# Patient Record
Sex: MALE | Race: WHITE | NOT HISPANIC OR LATINO | Employment: STUDENT | URBAN - METROPOLITAN AREA
[De-identification: names, ages, dates, MRNs, and addresses within clinical notes are randomized per-mention and may not be internally consistent; named-entity substitution may affect disease eponyms.]

---
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
## 2018-01-03 RX ORDER — SPIRONOLACTONE 100 MG/1
TABLET, FILM COATED ORAL
Qty: 60 TABLET | Refills: 1 | Status: SHIPPED | OUTPATIENT
Start: 2018-01-03

## 2024-04-16 ENCOUNTER — HOSPITAL ENCOUNTER (EMERGENCY)
Facility: HOSPITAL | Age: 24
Discharge: HOME | End: 2024-04-16
Attending: EMERGENCY MEDICINE
Payer: COMMERCIAL

## 2024-04-16 VITALS
HEIGHT: 68 IN | TEMPERATURE: 98 F | RESPIRATION RATE: 20 BRPM | BODY MASS INDEX: 21.22 KG/M2 | WEIGHT: 140 LBS | SYSTOLIC BLOOD PRESSURE: 118 MMHG | DIASTOLIC BLOOD PRESSURE: 79 MMHG | OXYGEN SATURATION: 99 % | HEART RATE: 92 BPM

## 2024-04-16 DIAGNOSIS — L03.116 CELLULITIS OF LEFT THIGH: ICD-10-CM

## 2024-04-16 DIAGNOSIS — I88.9 LYMPHADENITIS: Primary | ICD-10-CM

## 2024-04-16 DIAGNOSIS — R19.09 LEFT GROIN MASS: ICD-10-CM

## 2024-04-16 PROCEDURE — 76882 US LMTD JT/FCL EVL NVASC XTR: CPT | Mod: 26,LT,, | Performed by: RADIOLOGY

## 2024-04-16 PROCEDURE — 93971 EXTREMITY STUDY: CPT | Mod: TC,LT

## 2024-04-16 PROCEDURE — 76882 US LMTD JT/FCL EVL NVASC XTR: CPT | Mod: TC,LT

## 2024-04-16 PROCEDURE — 93971 EXTREMITY STUDY: CPT | Mod: 26,LT,, | Performed by: RADIOLOGY

## 2024-04-16 PROCEDURE — 99284 EMERGENCY DEPT VISIT MOD MDM: CPT | Mod: 25

## 2024-04-16 RX ORDER — SULFAMETHOXAZOLE AND TRIMETHOPRIM 800; 160 MG/1; MG/1
1 TABLET ORAL 2 TIMES DAILY
Qty: 14 TABLET | Refills: 0 | Status: SHIPPED | OUTPATIENT
Start: 2024-04-16 | End: 2024-04-25 | Stop reason: HOSPADM

## 2024-04-16 NOTE — ED PROVIDER NOTES
"   History     Chief Complaint   Patient presents with    Leg Pain     On high dose estrogen, for the past week has had red hard area to left groin. Was ordered US r/o DVT by Dr. Sullivan but the earliest they can see her is may 2nd.      HPI:  Jadiel Wright" is a 23 y.o. trans male-to-female patient with PMH as below on high-dose estrogen therapy with endocrinology who presents to the [facility] emergency department for evaluation of left groin swollen lump/mass x1 week with associated subjective fever/chills. Scheduled for DVT r/o US but earliest available is May 2nd.       PCP: No primary care provider on file.    Review of patient's allergies indicates:   Allergen Reactions    No known drug allergies       Past Medical History:   Diagnosis Date    ADHD (attention deficit hyperactivity disorder)     Dr Gar, as of Oct 2012 not taking any meds    Complication of anesthesia     has dental braces in place 2012, cannot remove    Gender dysphoria     followed by Endocrine, Dr. Salgado, in transition    Headache(784.0)     PERFORATION OF TYMPANIC MEMBRANE 2012    right, with otitis 9/28/12     Past Surgical History:   Procedure Laterality Date    ADENOIDECTOMY      TONSILLECTOMY  2002    TYMPANOSTOMY TUBE PLACEMENT  1999,2001    has had bilateral patches to eardrum       Family History   Problem Relation Name Age of Onset    Migraines Mother Praveena     Alcohol abuse Father      Other Brother Mat 1/2 Harish Damico         Mild AR    Migraines Maternal Aunt x2     Migraines Maternal Uncle x3     Heart disease Maternal Grandmother          Arrhythmia    Hypertension Maternal Grandmother      Migraines Maternal Grandmother      Alcohol abuse Maternal Grandfather      COPD Maternal Grandfather      Intellectual disability Maternal Grandfather      Migraines Maternal Grandfather      Allergic rhinitis Brother Laura 1/2 Damien Damico     Migraines Other Mat Dz     Alcohol abuse Other Mat Dz      Social " "History     Tobacco Use    Smoking status: Never    Smokeless tobacco: Never   Substance and Sexual Activity    Alcohol use: Yes     Comment: socially    Drug use: Yes     Types: Marijuana    Sexual activity: Never      Review of Systems     Review of Systems   Constitutional: Negative.    HENT: Negative.     Eyes: Negative.    Respiratory: Negative.     Cardiovascular: Negative.    Gastrointestinal: Negative.    Endocrine: Negative.    Genitourinary: Negative.    Musculoskeletal: Negative.    Skin: Negative.    Allergic/Immunologic: Negative.    Neurological: Negative.  Negative for weakness and numbness.   Hematological: Negative.    Psychiatric/Behavioral: Negative.     All other systems reviewed and are negative.       Physical Exam     Initial Vitals [04/16/24 0922]   BP Pulse Resp Temp SpO2   127/73 (!) 114 20 98.1 °F (36.7 °C) 98 %      MAP       --          Nursing notes and vital signs reviewed.  Constitutional: Patient is in mild distress.   Head: Normocephalic. Atraumatic.   Eyes:  Conjunctivae are not pale. No scleral icterus.   ENT: Mucous membranes moist.   Neck: Supple.   Cardiovascular: Regular rate. Regular rhythm.   Pulmonary: No respiratory distress.   Abdominal: Non-distended.   Musculoskeletal: Moves all extremities. No obvious deformities. Firm, nonfluctuant, warm, erythematous mass palpable in the left groin/proximal thigh that is 4.5 x 3 cm.  Skin: Warm and dry.   Neurological:  Alert, awake, and appropriate. Normal speech. No acute lateralizing neurologic deficits appreciated.   Psychiatric: Normal affect.       ED Course   Procedures  Vitals:    04/16/24 0922 04/16/24 1205   BP: 127/73 133/71   Pulse: (!) 114 78   Resp: 20    Temp: 98.1 °F (36.7 °C)    TempSrc: Oral    SpO2: 98% 99%   Weight: 63.5 kg (140 lb)    Height: 5' 8" (1.727 m)      Lab Results Interpreted as Abnormal:  Labs Reviewed - No data to display   All Lab Results:  Results for orders placed or performed in visit on 03/08/17 "   Estradiol   Result Value Ref Range    Estradiol 71 (H) 11 - 44 pg/mL   Testosterone   Result Value Ref Range    Testosterone, Total 677 195.0 - 1138.0 ng/dL   Basic metabolic panel   Result Value Ref Range    Sodium 138 136 - 145 mmol/L    Potassium 4.0 3.5 - 5.1 mmol/L    Chloride 103 95 - 110 mmol/L    CO2 26 23 - 29 mmol/L    Glucose 77 70 - 110 mg/dL    BUN 13 6 - 20 mg/dL    Creatinine 1.0 0.5 - 1.4 mg/dL    Calcium 9.8 8.7 - 10.5 mg/dL    Anion Gap 9 8 - 16 mmol/L    eGFR if African American >60.0 >60 mL/min/1.73 m^2    eGFR if non African American >60.0 >60 mL/min/1.73 m^2     Imaging Results              US Soft Tissue, Groin Left (Final result)  Result time 04/16/24 12:12:17      Final result by Jayme Mercedes MD (04/16/24 12:12:17)                   Impression:      1. No evidence of deep venous thrombosis in the left lower extremity.  2. Scattered low level venous echoes, particularly within the popliteal vein, which may reflect slow flow with rouleaux phenomenon.  3. Focused ultrasound of the left upper thigh in the area of concern demonstrated an enlarged left inguinal lymph node and surrounding ill-defined subcutaneous hypoechogenicity concerning for cellulitis with possible reactive lymph node, although, nonspecific.  Focal complex region of ill-defined hypoechogenicity within the subcutaneous fat adjacent to the enlarged lymph node, may reflect focal soft tissue infection/developing phlegmon in the proper clinical setting.  Posttraumatic hematoma/soft tissue injury may appear similarly.  No discrete abscess.      Electronically signed by: Jayme Mercedes  Date:    04/16/2024  Time:    12:12               Narrative:    EXAMINATION:  US LOWER EXTREMITY VEINS LEFT; US SOFT TISSUE, GROIN LEFT    CLINICAL HISTORY:  left groin mass; Other intra-abdominal and pelvic swelling, mass and lump    TECHNIQUE:  Duplex and color flow Doppler evaluation and graded compression of the left lower extremity veins was  performed.  Focused ultrasound in the left upper thigh in the area of concern was also performed.    COMPARISON:  None    FINDINGS:  Left thigh veins: The common femoral, femoral, popliteal, upper greater saphenous, and deep femoral veins are patent and free of thrombus. The veins are normally compressible and have normal phasic flow and augmentation response.  Scattered low-level echoes within the venous structures, particularly the popliteal vein, which may reflect slow flow with rouleaux phenomenon.    Left calf veins: The visualized calf veins are patent.    Contralateral CFV: The contralateral (right) common femoral vein is patent and free of thrombus.    Miscellaneous: Focused ultrasound in the left upper thigh in the area of concern demonstrated an enlarged left inguinal lymph node measuring 4.1 x 1.6 x 2.0 cm.  There is diffuse ill-defined subcutaneous edema in the left groin around the area concern with a more focal complex region of ill-defined hypoechogenicity within the subcutaneous fat adjacent to the enlarged lymph node, measuring 3.3 x 1.1 x 2.1 cm.  No discrete drainable fluid collection to suggest abscess.                                       US Lower Extremity Veins Left (Final result)  Result time 04/16/24 12:12:17      Final result by Jayme Mercedes MD (04/16/24 12:12:17)                   Impression:      1. No evidence of deep venous thrombosis in the left lower extremity.  2. Scattered low level venous echoes, particularly within the popliteal vein, which may reflect slow flow with rouleaux phenomenon.  3. Focused ultrasound of the left upper thigh in the area of concern demonstrated an enlarged left inguinal lymph node and surrounding ill-defined subcutaneous hypoechogenicity concerning for cellulitis with possible reactive lymph node, although, nonspecific.  Focal complex region of ill-defined hypoechogenicity within the subcutaneous fat adjacent to the enlarged lymph node, may reflect focal  soft tissue infection/developing phlegmon in the proper clinical setting.  Posttraumatic hematoma/soft tissue injury may appear similarly.  No discrete abscess.      Electronically signed by: Jayme Mercedes  Date:    04/16/2024  Time:    12:12               Narrative:    EXAMINATION:  US LOWER EXTREMITY VEINS LEFT; US SOFT TISSUE, GROIN LEFT    CLINICAL HISTORY:  left groin mass; Other intra-abdominal and pelvic swelling, mass and lump    TECHNIQUE:  Duplex and color flow Doppler evaluation and graded compression of the left lower extremity veins was performed.  Focused ultrasound in the left upper thigh in the area of concern was also performed.    COMPARISON:  None    FINDINGS:  Left thigh veins: The common femoral, femoral, popliteal, upper greater saphenous, and deep femoral veins are patent and free of thrombus. The veins are normally compressible and have normal phasic flow and augmentation response.  Scattered low-level echoes within the venous structures, particularly the popliteal vein, which may reflect slow flow with rouleaux phenomenon.    Left calf veins: The visualized calf veins are patent.    Contralateral CFV: The contralateral (right) common femoral vein is patent and free of thrombus.    Miscellaneous: Focused ultrasound in the left upper thigh in the area of concern demonstrated an enlarged left inguinal lymph node measuring 4.1 x 1.6 x 2.0 cm.  There is diffuse ill-defined subcutaneous edema in the left groin around the area concern with a more focal complex region of ill-defined hypoechogenicity within the subcutaneous fat adjacent to the enlarged lymph node, measuring 3.3 x 1.1 x 2.1 cm.  No discrete drainable fluid collection to suggest abscess.                                       The emergency physician reviewed the vital signs / test results outlined above.     ED Discussion      Patient's evaluation in the ED does not suggest any emergent or life-threatening medical conditions requiring  immediate intervention beyond what was provided in the ED, and I believe patient is safe for discharge. Regardless, an unremarkable evaluation in the ED does not preclude the development or presence of a serious or life-threatening condition. As such, patient was given return instructions for any change or worsening of symptoms.       ED Medication(s) Administered:  Medications - No data to display    Prescription Management: I performed a review of the patient's current Rx medication list as input by nursing staff.    Patient's Medications   New Prescriptions    SULFAMETHOXAZOLE-TRIMETHOPRIM 800-160MG (BACTRIM DS) 800-160 MG TAB    Take 1 tablet by mouth 2 (two) times daily. for 7 days   Previous Medications    ESTRADIOL (ESTRACE) 2 MG TABLET    Take 3 mg twice a day    RETIN-A 0.025 % CREAM        SPIRONOLACTONE (ALDACTONE) 100 MG TABLET    TAKE 1 TABLET(100 MG) BY MOUTH TWICE DAILY   Modified Medications    No medications on file   Discontinued Medications    CLINDAMYCIN (CLEOCIN T) 1 % EXTERNAL SOLUTION             Follow-up Information       your primary care doctor. Schedule an appointment as soon as possible for a visit on 4/22/2024.               [facility] Emergency Dept.    Specialty: Emergency Medicine  Why: As needed, If symptoms worsen  Contact information:  [address] [place] [address] [phone]                          Clinical Impression       ICD-10-CM ICD-9-CM   1. Lymphadenitis  I88.9 289.3   2. Left groin mass  R19.09 789.39   3. Left groin mass  R19.09 789.39   4. Cellulitis of left thigh  L03.116 682.6      ED Disposition Condition    Discharge Stable             Uvaldo Torre MD  04/16/24 0926

## 2024-04-24 ENCOUNTER — ANESTHESIA EVENT (OUTPATIENT)
Dept: SURGERY | Facility: HOSPITAL | Age: 24
End: 2024-04-24
Payer: COMMERCIAL

## 2024-04-24 ENCOUNTER — ANESTHESIA (OUTPATIENT)
Dept: SURGERY | Facility: HOSPITAL | Age: 24
End: 2024-04-24
Payer: COMMERCIAL

## 2024-04-24 ENCOUNTER — HOSPITAL ENCOUNTER (OUTPATIENT)
Facility: HOSPITAL | Age: 24
Discharge: HOME | End: 2024-04-26
Attending: STUDENT IN AN ORGANIZED HEALTH CARE EDUCATION/TRAINING PROGRAM | Admitting: INTERNAL MEDICINE
Payer: COMMERCIAL

## 2024-04-24 DIAGNOSIS — L02.91 ABSCESS: Primary | ICD-10-CM

## 2024-04-24 DIAGNOSIS — R07.9 CHEST PAIN: ICD-10-CM

## 2024-04-24 DIAGNOSIS — L02.416 ABSCESS OF LEFT THIGH: ICD-10-CM

## 2024-04-24 LAB
ALBUMIN SERPL BCP-MCNC: 3.7 G/DL (ref 3.5–5.2)
ALP SERPL-CCNC: 61 U/L (ref 55–135)
ALT SERPL W/O P-5'-P-CCNC: 18 U/L (ref 10–44)
ANION GAP SERPL CALC-SCNC: 12 MMOL/L (ref 8–16)
AST SERPL-CCNC: 16 U/L (ref 10–40)
BASOPHILS # BLD AUTO: 0.04 K/UL (ref 0–0.2)
BASOPHILS NFR BLD: 0.4 % (ref 0–1.9)
BILIRUB SERPL-MCNC: 0.4 MG/DL (ref 0.1–1)
BUN SERPL-MCNC: 19 MG/DL (ref 6–20)
CALCIUM SERPL-MCNC: 9.3 MG/DL (ref 8.7–10.5)
CHLORIDE SERPL-SCNC: 102 MMOL/L (ref 95–110)
CO2 SERPL-SCNC: 24 MMOL/L (ref 23–29)
CREAT SERPL-MCNC: 1 MG/DL (ref 0.5–1.4)
DIFFERENTIAL METHOD BLD: ABNORMAL
EOSINOPHIL # BLD AUTO: 0.4 K/UL (ref 0–0.5)
EOSINOPHIL NFR BLD: 4.2 % (ref 0–8)
ERYTHROCYTE [DISTWIDTH] IN BLOOD BY AUTOMATED COUNT: 11.7 % (ref 11.5–14.5)
EST. GFR  (NO RACE VARIABLE): >60 ML/MIN/1.73 M^2
GLUCOSE SERPL-MCNC: 85 MG/DL (ref 70–110)
HCT VFR BLD AUTO: 39.5 % (ref 40–54)
HCV AB SERPL QL IA: NORMAL
HGB BLD-MCNC: 13.2 G/DL (ref 14–18)
IMM GRANULOCYTES # BLD AUTO: 0.06 K/UL (ref 0–0.04)
IMM GRANULOCYTES NFR BLD AUTO: 0.6 % (ref 0–0.5)
LYMPHOCYTES # BLD AUTO: 1.4 K/UL (ref 1–4.8)
LYMPHOCYTES NFR BLD: 14.7 % (ref 18–48)
MCH RBC QN AUTO: 27.8 PG (ref 27–31)
MCHC RBC AUTO-ENTMCNC: 33.4 G/DL (ref 32–36)
MCV RBC AUTO: 83 FL (ref 82–98)
MONOCYTES # BLD AUTO: 1 K/UL (ref 0.3–1)
MONOCYTES NFR BLD: 10.7 % (ref 4–15)
NEUTROPHILS # BLD AUTO: 6.7 K/UL (ref 1.8–7.7)
NEUTROPHILS NFR BLD: 69.4 % (ref 38–73)
NRBC BLD-RTO: 0 /100 WBC
PLATELET # BLD AUTO: 337 K/UL (ref 150–450)
PMV BLD AUTO: 9.4 FL (ref 9.2–12.9)
POTASSIUM SERPL-SCNC: 4.1 MMOL/L (ref 3.5–5.1)
PROT SERPL-MCNC: 7.9 G/DL (ref 6–8.4)
RBC # BLD AUTO: 4.74 M/UL (ref 4.6–6.2)
SODIUM SERPL-SCNC: 138 MMOL/L (ref 136–145)
WBC # BLD AUTO: 9.72 K/UL (ref 3.9–12.7)

## 2024-04-24 PROCEDURE — 10061 I&D ABSCESS COMP/MULTIPLE: CPT | Mod: ,,, | Performed by: SPECIALIST

## 2024-04-24 PROCEDURE — 63600175 PHARM REV CODE 636 W HCPCS: Performed by: STUDENT IN AN ORGANIZED HEALTH CARE EDUCATION/TRAINING PROGRAM

## 2024-04-24 PROCEDURE — 25000003 PHARM REV CODE 250: Performed by: STUDENT IN AN ORGANIZED HEALTH CARE EDUCATION/TRAINING PROGRAM

## 2024-04-24 PROCEDURE — 99223 1ST HOSP IP/OBS HIGH 75: CPT | Mod: ,,, | Performed by: STUDENT IN AN ORGANIZED HEALTH CARE EDUCATION/TRAINING PROGRAM

## 2024-04-24 PROCEDURE — 73701 CT LOWER EXTREMITY W/DYE: CPT | Mod: 26,LT,, | Performed by: RADIOLOGY

## 2024-04-24 PROCEDURE — 96375 TX/PRO/DX INJ NEW DRUG ADDON: CPT | Mod: 59

## 2024-04-24 PROCEDURE — 99204 OFFICE O/P NEW MOD 45 MIN: CPT | Mod: 25,,, | Performed by: SPECIALIST

## 2024-04-24 PROCEDURE — 96368 THER/DIAG CONCURRENT INF: CPT | Mod: 59

## 2024-04-24 PROCEDURE — 25000003 PHARM REV CODE 250: Performed by: SPECIALIST

## 2024-04-24 PROCEDURE — 87389 HIV-1 AG W/HIV-1&-2 AB AG IA: CPT | Performed by: EMERGENCY MEDICINE

## 2024-04-24 PROCEDURE — 96361 HYDRATE IV INFUSION ADD-ON: CPT | Mod: 59

## 2024-04-24 PROCEDURE — 87070 CULTURE OTHR SPECIMN AEROBIC: CPT | Mod: 59 | Performed by: SPECIALIST

## 2024-04-24 PROCEDURE — 25000003 PHARM REV CODE 250: Performed by: NURSE ANESTHETIST, CERTIFIED REGISTERED

## 2024-04-24 PROCEDURE — 63600175 PHARM REV CODE 636 W HCPCS: Performed by: SPECIALIST

## 2024-04-24 PROCEDURE — 73701 CT LOWER EXTREMITY W/DYE: CPT | Mod: TC,LT

## 2024-04-24 PROCEDURE — 63600175 PHARM REV CODE 636 W HCPCS: Performed by: NURSE ANESTHETIST, CERTIFIED REGISTERED

## 2024-04-24 PROCEDURE — 86803 HEPATITIS C AB TEST: CPT | Performed by: EMERGENCY MEDICINE

## 2024-04-24 PROCEDURE — 87040 BLOOD CULTURE FOR BACTERIA: CPT | Performed by: STUDENT IN AN ORGANIZED HEALTH CARE EDUCATION/TRAINING PROGRAM

## 2024-04-24 PROCEDURE — 36000704 HC OR TIME LEV I 1ST 15 MIN: Performed by: SPECIALIST

## 2024-04-24 PROCEDURE — G0378 HOSPITAL OBSERVATION PER HR: HCPCS

## 2024-04-24 PROCEDURE — 71000033 HC RECOVERY, INTIAL HOUR: Performed by: SPECIALIST

## 2024-04-24 PROCEDURE — D9220A PRA ANESTHESIA: Mod: ANES,,, | Performed by: SPECIALIST

## 2024-04-24 PROCEDURE — 37000009 HC ANESTHESIA EA ADD 15 MINS: Performed by: SPECIALIST

## 2024-04-24 PROCEDURE — 87150 DNA/RNA AMPLIFIED PROBE: CPT | Performed by: STUDENT IN AN ORGANIZED HEALTH CARE EDUCATION/TRAINING PROGRAM

## 2024-04-24 PROCEDURE — 36000705 HC OR TIME LEV I EA ADD 15 MIN: Performed by: SPECIALIST

## 2024-04-24 PROCEDURE — 96366 THER/PROPH/DIAG IV INF ADDON: CPT | Mod: 59

## 2024-04-24 PROCEDURE — 87070 CULTURE OTHR SPECIMN AEROBIC: CPT | Mod: 59 | Performed by: PHYSICIAN ASSISTANT

## 2024-04-24 PROCEDURE — 99285 EMERGENCY DEPT VISIT HI MDM: CPT | Mod: 25

## 2024-04-24 PROCEDURE — 85025 COMPLETE CBC W/AUTO DIFF WBC: CPT | Performed by: PHYSICIAN ASSISTANT

## 2024-04-24 PROCEDURE — 25500020 PHARM REV CODE 255: Performed by: PHYSICIAN ASSISTANT

## 2024-04-24 PROCEDURE — 87075 CULTR BACTERIA EXCEPT BLOOD: CPT | Mod: 59 | Performed by: SPECIALIST

## 2024-04-24 PROCEDURE — 37000008 HC ANESTHESIA 1ST 15 MINUTES: Performed by: SPECIALIST

## 2024-04-24 PROCEDURE — D9220A PRA ANESTHESIA: Mod: CRNA,,, | Performed by: NURSE ANESTHETIST, CERTIFIED REGISTERED

## 2024-04-24 PROCEDURE — 96365 THER/PROPH/DIAG IV INF INIT: CPT | Mod: 59

## 2024-04-24 PROCEDURE — 80053 COMPREHEN METABOLIC PANEL: CPT | Performed by: PHYSICIAN ASSISTANT

## 2024-04-24 RX ORDER — NALOXONE HCL 0.4 MG/ML
0.02 VIAL (ML) INJECTION
Status: DISCONTINUED | OUTPATIENT
Start: 2024-04-24 | End: 2024-04-26 | Stop reason: HOSPADM

## 2024-04-24 RX ORDER — HYDROCODONE BITARTRATE AND ACETAMINOPHEN 5; 325 MG/1; MG/1
1 TABLET ORAL EVERY 4 HOURS PRN
Status: DISCONTINUED | OUTPATIENT
Start: 2024-04-24 | End: 2024-04-26 | Stop reason: HOSPADM

## 2024-04-24 RX ORDER — LIDOCAINE HYDROCHLORIDE 10 MG/ML
1 INJECTION, SOLUTION EPIDURAL; INFILTRATION; INTRACAUDAL; PERINEURAL ONCE
Status: DISCONTINUED | OUTPATIENT
Start: 2024-04-24 | End: 2024-04-24 | Stop reason: HOSPADM

## 2024-04-24 RX ORDER — ACETAMINOPHEN 10 MG/ML
INJECTION, SOLUTION INTRAVENOUS
Status: DISCONTINUED | OUTPATIENT
Start: 2024-04-24 | End: 2024-04-24

## 2024-04-24 RX ORDER — ONDANSETRON HYDROCHLORIDE 2 MG/ML
4 INJECTION, SOLUTION INTRAVENOUS DAILY PRN
Status: DISCONTINUED | OUTPATIENT
Start: 2024-04-24 | End: 2024-04-24 | Stop reason: HOSPADM

## 2024-04-24 RX ORDER — FENTANYL CITRATE 50 UG/ML
INJECTION, SOLUTION INTRAMUSCULAR; INTRAVENOUS
Status: DISCONTINUED | OUTPATIENT
Start: 2024-04-24 | End: 2024-04-24

## 2024-04-24 RX ORDER — IBUPROFEN 200 MG
16 TABLET ORAL
Status: DISCONTINUED | OUTPATIENT
Start: 2024-04-24 | End: 2024-04-26 | Stop reason: HOSPADM

## 2024-04-24 RX ORDER — MIDAZOLAM HYDROCHLORIDE 1 MG/ML
INJECTION INTRAMUSCULAR; INTRAVENOUS
Status: DISCONTINUED | OUTPATIENT
Start: 2024-04-24 | End: 2024-04-24

## 2024-04-24 RX ORDER — OXYCODONE HYDROCHLORIDE 5 MG/1
5 TABLET ORAL ONCE AS NEEDED
Status: DISCONTINUED | OUTPATIENT
Start: 2024-04-24 | End: 2024-04-24 | Stop reason: HOSPADM

## 2024-04-24 RX ORDER — LIDOCAINE HYDROCHLORIDE 20 MG/ML
INJECTION INTRAVENOUS
Status: DISCONTINUED | OUTPATIENT
Start: 2024-04-24 | End: 2024-04-24

## 2024-04-24 RX ORDER — CEFAZOLIN SODIUM 1 G/3ML
2 INJECTION, POWDER, FOR SOLUTION INTRAMUSCULAR; INTRAVENOUS
Status: DISCONTINUED | OUTPATIENT
Start: 2024-04-24 | End: 2024-04-24 | Stop reason: CLARIF

## 2024-04-24 RX ORDER — HYDROMORPHONE HYDROCHLORIDE 1 MG/ML
0.5 INJECTION, SOLUTION INTRAMUSCULAR; INTRAVENOUS; SUBCUTANEOUS EVERY 5 MIN PRN
Status: DISCONTINUED | OUTPATIENT
Start: 2024-04-24 | End: 2024-04-24 | Stop reason: HOSPADM

## 2024-04-24 RX ORDER — GLUCAGON 1 MG
1 KIT INJECTION
Status: DISCONTINUED | OUTPATIENT
Start: 2024-04-24 | End: 2024-04-26 | Stop reason: HOSPADM

## 2024-04-24 RX ORDER — PROPOFOL 10 MG/ML
VIAL (ML) INTRAVENOUS
Status: DISCONTINUED | OUTPATIENT
Start: 2024-04-24 | End: 2024-04-24

## 2024-04-24 RX ORDER — SODIUM CHLORIDE 9 MG/ML
INJECTION, SOLUTION INTRAVENOUS CONTINUOUS
Status: DISCONTINUED | OUTPATIENT
Start: 2024-04-24 | End: 2024-04-26 | Stop reason: HOSPADM

## 2024-04-24 RX ORDER — MEPERIDINE HYDROCHLORIDE 50 MG/ML
12.5 INJECTION INTRAMUSCULAR; INTRAVENOUS; SUBCUTANEOUS EVERY 10 MIN PRN
Status: DISCONTINUED | OUTPATIENT
Start: 2024-04-24 | End: 2024-04-24 | Stop reason: HOSPADM

## 2024-04-24 RX ORDER — PROCHLORPERAZINE EDISYLATE 5 MG/ML
5 INJECTION INTRAMUSCULAR; INTRAVENOUS EVERY 6 HOURS PRN
Status: DISCONTINUED | OUTPATIENT
Start: 2024-04-24 | End: 2024-04-26 | Stop reason: HOSPADM

## 2024-04-24 RX ORDER — IBUPROFEN 200 MG
400 TABLET ORAL EVERY 6 HOURS PRN
COMMUNITY

## 2024-04-24 RX ORDER — MORPHINE SULFATE 2 MG/ML
2 INJECTION, SOLUTION INTRAMUSCULAR; INTRAVENOUS EVERY 4 HOURS PRN
Status: DISCONTINUED | OUTPATIENT
Start: 2024-04-24 | End: 2024-04-26 | Stop reason: HOSPADM

## 2024-04-24 RX ORDER — SODIUM CHLORIDE 0.9 % (FLUSH) 0.9 %
10 SYRINGE (ML) INJECTION EVERY 12 HOURS PRN
Status: DISCONTINUED | OUTPATIENT
Start: 2024-04-24 | End: 2024-04-26 | Stop reason: HOSPADM

## 2024-04-24 RX ORDER — OXYCODONE HYDROCHLORIDE 5 MG/1
5 TABLET ORAL EVERY 6 HOURS PRN
Status: DISCONTINUED | OUTPATIENT
Start: 2024-04-24 | End: 2024-04-24

## 2024-04-24 RX ORDER — DEXAMETHASONE SODIUM PHOSPHATE 4 MG/ML
INJECTION, SOLUTION INTRA-ARTICULAR; INTRALESIONAL; INTRAMUSCULAR; INTRAVENOUS; SOFT TISSUE
Status: DISCONTINUED | OUTPATIENT
Start: 2024-04-24 | End: 2024-04-24

## 2024-04-24 RX ORDER — ONDANSETRON HYDROCHLORIDE 2 MG/ML
4 INJECTION, SOLUTION INTRAVENOUS EVERY 8 HOURS PRN
Status: DISCONTINUED | OUTPATIENT
Start: 2024-04-24 | End: 2024-04-26 | Stop reason: HOSPADM

## 2024-04-24 RX ORDER — IBUPROFEN 200 MG
24 TABLET ORAL
Status: DISCONTINUED | OUTPATIENT
Start: 2024-04-24 | End: 2024-04-26 | Stop reason: HOSPADM

## 2024-04-24 RX ORDER — SODIUM CHLORIDE, SODIUM LACTATE, POTASSIUM CHLORIDE, CALCIUM CHLORIDE 600; 310; 30; 20 MG/100ML; MG/100ML; MG/100ML; MG/100ML
INJECTION, SOLUTION INTRAVENOUS CONTINUOUS
Status: DISCONTINUED | OUTPATIENT
Start: 2024-04-24 | End: 2024-04-24

## 2024-04-24 RX ORDER — SODIUM CHLORIDE, SODIUM LACTATE, POTASSIUM CHLORIDE, CALCIUM CHLORIDE 600; 310; 30; 20 MG/100ML; MG/100ML; MG/100ML; MG/100ML
125 INJECTION, SOLUTION INTRAVENOUS CONTINUOUS
Status: DISCONTINUED | OUTPATIENT
Start: 2024-04-24 | End: 2024-04-24

## 2024-04-24 RX ORDER — ONDANSETRON HYDROCHLORIDE 2 MG/ML
INJECTION, SOLUTION INTRAMUSCULAR; INTRAVENOUS
Status: DISCONTINUED | OUTPATIENT
Start: 2024-04-24 | End: 2024-04-24

## 2024-04-24 RX ADMIN — SODIUM CHLORIDE, POTASSIUM CHLORIDE, SODIUM LACTATE AND CALCIUM CHLORIDE: 600; 310; 30; 20 INJECTION, SOLUTION INTRAVENOUS at 02:04

## 2024-04-24 RX ADMIN — MORPHINE SULFATE 2 MG: 2 INJECTION, SOLUTION INTRAMUSCULAR; INTRAVENOUS at 11:04

## 2024-04-24 RX ADMIN — CEFEPIME 2 G: 2 INJECTION, POWDER, FOR SOLUTION INTRAVENOUS at 02:04

## 2024-04-24 RX ADMIN — MIDAZOLAM HYDROCHLORIDE 2 MG: 1 INJECTION, SOLUTION INTRAMUSCULAR; INTRAVENOUS at 02:04

## 2024-04-24 RX ADMIN — IOHEXOL 75 ML: 350 INJECTION, SOLUTION INTRAVENOUS at 12:04

## 2024-04-24 RX ADMIN — FENTANYL CITRATE 100 MCG: 50 INJECTION, SOLUTION INTRAMUSCULAR; INTRAVENOUS at 02:04

## 2024-04-24 RX ADMIN — SODIUM CHLORIDE, POTASSIUM CHLORIDE, SODIUM LACTATE AND CALCIUM CHLORIDE 125 ML/HR: 600; 310; 30; 20 INJECTION, SOLUTION INTRAVENOUS at 04:04

## 2024-04-24 RX ADMIN — HYDROMORPHONE HYDROCHLORIDE 0.5 MG: 1 INJECTION, SOLUTION INTRAMUSCULAR; INTRAVENOUS; SUBCUTANEOUS at 04:04

## 2024-04-24 RX ADMIN — ONDANSETRON 4 MG: 2 INJECTION INTRAMUSCULAR; INTRAVENOUS at 11:04

## 2024-04-24 RX ADMIN — MORPHINE SULFATE 2 MG: 2 INJECTION, SOLUTION INTRAMUSCULAR; INTRAVENOUS at 03:04

## 2024-04-24 RX ADMIN — VANCOMYCIN HYDROCHLORIDE 1500 MG: 1.5 INJECTION, POWDER, LYOPHILIZED, FOR SOLUTION INTRAVENOUS at 05:04

## 2024-04-24 RX ADMIN — ONDANSETRON 8 MG: 2 INJECTION INTRAMUSCULAR; INTRAVENOUS at 02:04

## 2024-04-24 RX ADMIN — ACETAMINOPHEN 1000 MG: 10 INJECTION, SOLUTION INTRAVENOUS at 03:04

## 2024-04-24 RX ADMIN — LIDOCAINE HYDROCHLORIDE 50 MG: 20 INJECTION, SOLUTION INTRAVENOUS at 02:04

## 2024-04-24 RX ADMIN — DEXAMETHASONE SODIUM PHOSPHATE 4 MG: 4 INJECTION, SOLUTION INTRAMUSCULAR; INTRAVENOUS at 02:04

## 2024-04-24 RX ADMIN — SODIUM CHLORIDE: 9 INJECTION, SOLUTION INTRAVENOUS at 09:04

## 2024-04-24 RX ADMIN — PROPOFOL 200 MG: 10 INJECTION, EMULSION INTRAVENOUS at 02:04

## 2024-04-24 NOTE — CONSULTS
[facility] Surgery  General Surgery  Consult Note    Patient Name: Jadiel Reddy  MRN: [id number]  Admission Date: 4/24/2024  Attending Physician: Freddy Mckeon MD   Consult Physician: Carter Porter MD  Primary Care Provider: Yoly Primary Doctor    Patient information was obtained from patient and ER records.     Subjective:     Reason for consultation:  Assess left proximal thigh    History of Present Illness:  Jadiel Reddy is a 23 y.o. male with a history of gender transition presents with acute abscess left proximal thigh.  Patient reports it has been red and irritated for several days in his gotten progressively worse it is now become significantly redder and more tender tender and swollen.  CT scan obtained in the emergency room shows an area of cellulitic changes but he has a ballotable area consistent with an abscess pocket.  Consult was made for surgical surgery for surgical intervention.    Review of patient's allergies indicates:   Allergen Reactions    No known drug allergies        Past Medical History:   Diagnosis Date    ADHD (attention deficit hyperactivity disorder)     Dr Gar, as of Oct 2012 not taking any meds    Complication of anesthesia     has dental braces in place 2012, cannot remove    Gender dysphoria     followed by Endocrine, Dr. Salgado, in transition    Headache(784.0)     PERFORATION OF TYMPANIC MEMBRANE 2012    right, with otitis 9/28/12     Past Surgical History:   Procedure Laterality Date    ADENOIDECTOMY      TONSILLECTOMY  2002    TYMPANOSTOMY TUBE PLACEMENT  1999,2001    has had bilateral patches to eardrum     Family History       Problem Relation (Age of Onset)    Alcohol abuse Father, Maternal Grandfather, Other    Allergic rhinitis Brother    COPD Maternal Grandfather    Heart disease Maternal Grandmother    Hypertension Maternal Grandmother    Intellectual disability Maternal Grandfather    Migraines Mother, Maternal Aunt, Maternal Uncle, Maternal  Grandmother, Maternal Grandfather, Other    Other Brother          Tobacco Use    Smoking status: Former     Types: Cigarettes    Smokeless tobacco: Never   Substance and Sexual Activity    Alcohol use: Yes     Alcohol/week: 3.0 standard drinks of alcohol     Types: 3 Shots of liquor per week     Comment: 3 shots per day    Drug use: Yes     Types: Marijuana    Sexual activity: Not on file     Review of Systems   Skin:         Area of redness, pain and swelling left proximal thigh     Objective:     Vital Signs (Most Recent):  Temp: 98.4 °F (36.9 °C) (04/24/24 1416)  Pulse: 94 (04/24/24 1416)  Resp: 18 (04/24/24 1416)  BP: 134/80 (04/24/24 1416)  SpO2: 100 % (04/24/24 1416) Vital Signs (24h Range):  Temp:  [97.3 °F (36.3 °C)-98.4 °F (36.9 °C)] 98.4 °F (36.9 °C)  Pulse:  [94-95] 94  Resp:  [15-18] 18  SpO2:  [99 %-100 %] 100 %  BP: (125-134)/(77-80) 134/80     Weight: 60.8 kg (134 lb)    Body mass index is 20.37 kg/m².    Review of Systems   Skin:         Area of redness, pain and swelling left proximal thigh       Physical Exam  Vitals and nursing note reviewed.   Constitutional:       Appearance: Normal appearance. He is normal weight.   HENT:      Head: Normocephalic and atraumatic.      Nose: Nose normal.      Mouth/Throat:      Mouth: Mucous membranes are moist.   Eyes:      Extraocular Movements: Extraocular movements intact.      Pupils: Pupils are equal, round, and reactive to light.   Cardiovascular:      Rate and Rhythm: Normal rate.      Pulses: Normal pulses.   Pulmonary:      Effort: Pulmonary effort is normal.   Abdominal:      General: Abdomen is flat.      Palpations: Abdomen is soft.   Musculoskeletal:         General: Normal range of motion.      Cervical back: Normal range of motion.   Skin:     General: Skin is warm.      Findings: Erythema and lesion present.             Comments: Left proximal thigh anterior abscess.   Neurological:      General: No focal deficit present.      Mental Status: He  "is alert and oriented to person, place, and time. Mental status is at baseline.   Psychiatric:         Mood and Affect: Mood normal.         Behavior: Behavior normal.         Significant Labs:  CBC:   Recent Labs   Lab 04/24/24  1157   WBC 9.72   RBC 4.74   HGB 13.2*   HCT 39.5*      MCV 83   MCH 27.8   MCHC 33.4     BMP:   Recent Labs   Lab 04/24/24  1157   GLU 85      K 4.1      CO2 24   BUN 19   CREATININE 1.0   CALCIUM 9.3     CMP:   Recent Labs   Lab 04/24/24  1157   GLU 85   CALCIUM 9.3   ALBUMIN 3.7   PROT 7.9      K 4.1   CO2 24      BUN 19   CREATININE 1.0   ALKPHOS 61   ALT 18   AST 16   BILITOT 0.4     LFTs:   Recent Labs   Lab 04/24/24  1157   ALT 18   AST 16   ALKPHOS 61   BILITOT 0.4   PROT 7.9   ALBUMIN 3.7     Coagulation: No results for input(s): "LABPROT", "INR", "APTT" in the last 168 hours.  Specimen (24h ago, onward)      None          No results for input(s): "COLORU", "CLARITYU", "SPECGRAV", "PHUR", "PROTEINUA", "GLUCOSEU", "BILIRUBINCON", "BLOODU", "WBCU", "RBCU", "BACTERIA", "MUCUS", "NITRITE", "LEUKOCYTESUR", "UROBILINOGEN", "HYALINECASTS" in the last 168 hours.    Significant Diagnostics:  I have reviewed all pertinent imaging results/findings within the past 24 hours.  I have reviewed and interpreted all pertinent imaging results/findings within the past 24 hours.    Assessment:   Jadiel Reddy is a 23 y.o. male who presents with abscess left medial proximal thigh.  Plan to take patient to the operating room for drainage of same.  Risks and benefits have been explained to the patient and they state they understand and wished to proceed..    There are no hospital problems to display for this patient.    VTE Risk Mitigation (From admission, onward)           Ordered     IP VTE LOW RISK PATIENT  Once         04/24/24 1334     Place sequential compression device  Until discontinued         04/24/24 1334                    Medical Decision " Making/Plan:  Inna Porter MD  General Surgery  [facility]

## 2024-04-24 NOTE — ASSESSMENT & PLAN NOTE
Left thigh abscess- s/p I&D with general surgery on 4/24  IV abx  IVF  F/U blood and wound cultures  PRN medications for pain and nausea

## 2024-04-24 NOTE — ANESTHESIA PROCEDURE NOTES
Intubation    Date/Time: 4/24/2024 2:54 PM    Performed by: Jaylen Copeland CRNA  Authorized by: Jaylen Copeland CRNA    Intubation:     Induction:  Intravenous    Intubated:  Postinduction    Mask Ventilation:  Easy mask    Attempts:  1    Attempted By:  CRNA    Difficult Airway Encountered?: No      Complications:  None    Airway Device:  Supraglottic airway/LMA    Airway Device Size:  4.0    Secured at:  The lips    Placement Verified By:  Capnometry    Complicating Factors:  None    Findings Post-Intubation:  BS equal bilateral

## 2024-04-24 NOTE — TRANSFER OF CARE
"Anesthesia Transfer of Care Note    Patient: Jadiel Reddy    Procedure(s) Performed: Procedure(s) (LRB):  INCISION AND DRAINAGE, ABSCESS (Left)    Patient location: PACU    Anesthesia Type: general    Transport from OR: Transported from OR on room air with adequate spontaneous ventilation    Post pain: adequate analgesia    Post assessment: no apparent anesthetic complications and tolerated procedure well    Post vital signs: stable    Level of consciousness: awake, alert and oriented    Nausea/Vomiting: no nausea/vomiting    Complications: none    Transfer of care protocol was followed      Last vitals: Visit Vitals  /80 (BP Location: Left arm, Patient Position: Lying)   Pulse 94   Temp 36.9 °C (98.4 °F) (Temporal)   Resp 18   Ht 5' 8" (1.727 m)   Wt 60.8 kg (134 lb)   SpO2 100%   BMI 20.37 kg/m²     "

## 2024-04-24 NOTE — H&P (VIEW-ONLY)
[facility] Surgery  General Surgery  Consult Note    Patient Name: Jadiel Reddy  MRN: [id number]  Admission Date: 4/24/2024  Attending Physician: Freddy Mckeon MD   Consult Physician: Carter Porter MD  Primary Care Provider: Yoly Primary Doctor    Patient information was obtained from patient and ER records.     Subjective:     Reason for consultation:  Assess left proximal thigh    History of Present Illness:  Jadiel Reddy is a 23 y.o. male with a history of gender transition presents with acute abscess left proximal thigh.  Patient reports it has been red and irritated for several days in his gotten progressively worse it is now become significantly redder and more tender tender and swollen.  CT scan obtained in the emergency room shows an area of cellulitic changes but he has a ballotable area consistent with an abscess pocket.  Consult was made for surgical surgery for surgical intervention.    Review of patient's allergies indicates:   Allergen Reactions    No known drug allergies        Past Medical History:   Diagnosis Date    ADHD (attention deficit hyperactivity disorder)     Dr Gar, as of Oct 2012 not taking any meds    Complication of anesthesia     has dental braces in place 2012, cannot remove    Gender dysphoria     followed by Endocrine, Dr. Salgado, in transition    Headache(784.0)     PERFORATION OF TYMPANIC MEMBRANE 2012    right, with otitis 9/28/12     Past Surgical History:   Procedure Laterality Date    ADENOIDECTOMY      TONSILLECTOMY  2002    TYMPANOSTOMY TUBE PLACEMENT  1999,2001    has had bilateral patches to eardrum     Family History       Problem Relation (Age of Onset)    Alcohol abuse Father, Maternal Grandfather, Other    Allergic rhinitis Brother    COPD Maternal Grandfather    Heart disease Maternal Grandmother    Hypertension Maternal Grandmother    Intellectual disability Maternal Grandfather    Migraines Mother, Maternal Aunt, Maternal Uncle, Maternal  Grandmother, Maternal Grandfather, Other    Other Brother          Tobacco Use    Smoking status: Former     Types: Cigarettes    Smokeless tobacco: Never   Substance and Sexual Activity    Alcohol use: Yes     Alcohol/week: 3.0 standard drinks of alcohol     Types: 3 Shots of liquor per week     Comment: 3 shots per day    Drug use: Yes     Types: Marijuana    Sexual activity: Not on file     Review of Systems   Skin:         Area of redness, pain and swelling left proximal thigh     Objective:     Vital Signs (Most Recent):  Temp: 98.4 °F (36.9 °C) (04/24/24 1416)  Pulse: 94 (04/24/24 1416)  Resp: 18 (04/24/24 1416)  BP: 134/80 (04/24/24 1416)  SpO2: 100 % (04/24/24 1416) Vital Signs (24h Range):  Temp:  [97.3 °F (36.3 °C)-98.4 °F (36.9 °C)] 98.4 °F (36.9 °C)  Pulse:  [94-95] 94  Resp:  [15-18] 18  SpO2:  [99 %-100 %] 100 %  BP: (125-134)/(77-80) 134/80     Weight: 60.8 kg (134 lb)    Body mass index is 20.37 kg/m².    Review of Systems   Skin:         Area of redness, pain and swelling left proximal thigh       Physical Exam  Vitals and nursing note reviewed.   Constitutional:       Appearance: Normal appearance. He is normal weight.   HENT:      Head: Normocephalic and atraumatic.      Nose: Nose normal.      Mouth/Throat:      Mouth: Mucous membranes are moist.   Eyes:      Extraocular Movements: Extraocular movements intact.      Pupils: Pupils are equal, round, and reactive to light.   Cardiovascular:      Rate and Rhythm: Normal rate.      Pulses: Normal pulses.   Pulmonary:      Effort: Pulmonary effort is normal.   Abdominal:      General: Abdomen is flat.      Palpations: Abdomen is soft.   Musculoskeletal:         General: Normal range of motion.      Cervical back: Normal range of motion.   Skin:     General: Skin is warm.      Findings: Erythema and lesion present.             Comments: Left proximal thigh anterior abscess.   Neurological:      General: No focal deficit present.      Mental Status: He  "is alert and oriented to person, place, and time. Mental status is at baseline.   Psychiatric:         Mood and Affect: Mood normal.         Behavior: Behavior normal.         Significant Labs:  CBC:   Recent Labs   Lab 04/24/24  1157   WBC 9.72   RBC 4.74   HGB 13.2*   HCT 39.5*      MCV 83   MCH 27.8   MCHC 33.4     BMP:   Recent Labs   Lab 04/24/24  1157   GLU 85      K 4.1      CO2 24   BUN 19   CREATININE 1.0   CALCIUM 9.3     CMP:   Recent Labs   Lab 04/24/24  1157   GLU 85   CALCIUM 9.3   ALBUMIN 3.7   PROT 7.9      K 4.1   CO2 24      BUN 19   CREATININE 1.0   ALKPHOS 61   ALT 18   AST 16   BILITOT 0.4     LFTs:   Recent Labs   Lab 04/24/24  1157   ALT 18   AST 16   ALKPHOS 61   BILITOT 0.4   PROT 7.9   ALBUMIN 3.7     Coagulation: No results for input(s): "LABPROT", "INR", "APTT" in the last 168 hours.  Specimen (24h ago, onward)      None          No results for input(s): "COLORU", "CLARITYU", "SPECGRAV", "PHUR", "PROTEINUA", "GLUCOSEU", "BILIRUBINCON", "BLOODU", "WBCU", "RBCU", "BACTERIA", "MUCUS", "NITRITE", "LEUKOCYTESUR", "UROBILINOGEN", "HYALINECASTS" in the last 168 hours.    Significant Diagnostics:  I have reviewed all pertinent imaging results/findings within the past 24 hours.  I have reviewed and interpreted all pertinent imaging results/findings within the past 24 hours.    Assessment:   Jadiel Reddy is a 23 y.o. male who presents with abscess left medial proximal thigh.  Plan to take patient to the operating room for drainage of same.  Risks and benefits have been explained to the patient and they state they understand and wished to proceed..    There are no hospital problems to display for this patient.    VTE Risk Mitigation (From admission, onward)           Ordered     IP VTE LOW RISK PATIENT  Once         04/24/24 1334     Place sequential compression device  Until discontinued         04/24/24 1334                    Medical Decision " Making/Plan:  Inna Porter MD  General Surgery  [facility]

## 2024-04-24 NOTE — ED NOTES
Received report from BETTY Munoz. Pt is aaox3. Nadn. Resp eu. Vss. Denies any complaints at this time. Call light w/in reach. Family at bs

## 2024-04-24 NOTE — ED PROVIDER NOTES
Please note that my documentation in this Electronic Healthcare Record was produced using speech recognition software and therefore may contain errors related to that software.These could include grammar, punctuation and spelling errors or the inclusion/ exclusion of phrases that were not intended. Please contact myself for any clarification, questions or concerns.    HPI: Patient is a 23 y.o. male who presents with the chief complaint of possible abscess to his left upper leg X 4 weeks.  Initially noticed a knot there.  He was seen about 8 days ago and placed on Bactrim.  Had negative DVT ultrasound but soft tissue ultrasound showed hypoechoic area with subcu fat which could be soft tissue infection versus phlegmon versus enlarged lymph node. Noted some improvement in swelling but having worsening drainage and redness of the wound.  Does endorse some pain.  Denying fever, urethral discharge, dysuria, urgency, frequency, testicular pain or swelling.    REVIEW OF SYSTEMS - 10 systems were independently reviewed and are otherwise negative with the exception of those items previously documented in the HPI and nursing notes.    Allergy: No known drug allergies    Past medical history:   Past Medical History:   Diagnosis Date    ADHD (attention deficit hyperactivity disorder)     Dr Gar, as of Oct 2012 not taking any meds    Complication of anesthesia     has dental braces in place 2012, cannot remove    Gender dysphoria     followed by Endocrine, Dr. Salgado, in transition    Headache(784.0)     PERFORATION OF TYMPANIC MEMBRANE 2012    right, with otitis 9/28/12       Surgical History:   Past Surgical History:   Procedure Laterality Date    ADENOIDECTOMY      TONSILLECTOMY  2002    TYMPANOSTOMY TUBE PLACEMENT  1999,2001    has had bilateral patches to eardrum       Social history:   Social History     Socioeconomic History    Marital status: Single   Tobacco Use    Smoking status: Never    Smokeless tobacco: Never    Substance and Sexual Activity    Alcohol use: Yes     Comment: socially    Drug use: Yes     Types: Marijuana    Sexual activity: Never   Social History Narrative    SOC.HX: Parents never ; Dad is not involved. Lives with Mom and two 1/2-siblings w/ MGM, MGF, and 2 Mat.Uncles in [place], [place]. Mom, recently  from Step-Dad. Mom & Step-Dad have family in the area. Some support from Mom's & Step-Dad's families.     Mom works at [employer].     Step-Dad works on the [employer]. Other Caregivers: None.     NO smokers.     + pets -- 3 dogs, 4 birds, fish.    LEAD & TB/HIV RISK: Lead -- negative. TB/HIV -- negative.                Safety: Wears seatbelt 100% of time. Guns -- NO.                         Social Determinants of Health     Financial Resource Strain: Low Risk  (1/8/2021)    Received from [facility]    Overall Financial Resource Strain (CARDIA)     Difficulty of Paying Living Expenses: Not hard at all   Food Insecurity: No Food Insecurity (1/8/2021)    Received from [facility]    Hunger Vital Sign     Worried About Running Out of Food in the Last Year: Never true     Ran Out of Food in the Last Year: Never true   Transportation Needs: No Transportation Needs (1/8/2021)    Received from [facility]    PRAPARE - Transportation     Lack of Transportation (Medical): No     Lack of Transportation (Non-Medical): No   Physical Activity: Insufficiently Active (1/8/2021)    Received from [facility]    Exercise Vital Sign     Days of Exercise per Week: 1 day     Minutes of Exercise per Session: 40 min   Stress: Stress Concern Present (1/8/2021)    Received from [facility]    [place] [employer] - Occupational Stress Questionnaire     Feeling of Stress : To some extent   Social Connections: Socially Isolated  "(1/8/2021)    Received from [facility]    Social Connection and Isolation Panel [NHANES]     Frequency of Communication with Friends and Family: More than three times a week     Frequency of Social Gatherings with Friends and Family: More than three times a week     Attends [employer] Services: Never     Active Member of Clubs or Organizations: No     Attends Club or Organization Meetings: Never     Marital Status: Never        Family history: non-contributory    EHR: reviewed    Vitals: /77 (BP Location: Left arm, Patient Position: Sitting)   Pulse 95   Temp 97.3 °F (36.3 °C) (Oral)   Resp 15   Ht 5' 8" (1.727 m)   Wt 61.2 kg (134 lb 14.7 oz)   SpO2 99%   BMI 20.51 kg/m²     PHYSICAL EXAM:    General- awake and alert, oriented, GCS 15, in no acute distress,  HEENT- normocephalic, atraumatic, sclera anicteric  CARDIOVASCULAR- regular rate and rhythm  PULMONARY- nonlabored, no respiratory distress  NEUROLOGIC- mental status normal, speech fluid, cognition normal, CN II-XII grossly intact, ambulatory with proper gait.  MUSCULOSKELETAL- well-nourished, well-developed  DERMATOLOGIC- warm and dry, no visible rashes, baseball sized area erythema, induration, fluctuance, active drainage over the left upper leg.  No inguinal lymph node enlargement.  PSYCHIATRIC- normal affect, normal concentration           Labs Reviewed   CBC W/ AUTO DIFFERENTIAL - Abnormal; Notable for the following components:       Result Value    Hemoglobin 13.2 (*)     Hematocrit 39.5 (*)     Immature Granulocytes 0.6 (*)     Immature Grans (Abs) 0.06 (*)     Lymph % 14.7 (*)     All other components within normal limits   CULTURE, AEROBIC  (SPECIFY SOURCE)   CULTURE, BLOOD   CULTURE, BLOOD   COMPREHENSIVE METABOLIC PANEL   HIV 1 / 2 ANTIBODY   HEPATITIS C ANTIBODY       CT Thigh With Contrast Left   Final Result   Abnormal      1. Suspected cutaneous infection with underlying focal prominent " subcutaneous cellulitis of the proximal left thigh with inflammatory phlegmon.  Correlate clinically with possible fever and/or elevated white count.  This should be followed until clear to exclude the later development of a subcutaneous abscess as well as exclude the possibility of an underlying suspicious lesion.   2. Prominent left inguinal and left iliac lymph nodes which are likely reactive in etiology.  Primary and secondary malignancy are considered unlikely, however, not completely excludable.   This report was flagged in Epic as abnormal.         Electronically signed by: Romel Dobbins   Date:    04/24/2024   Time:    12:46          MEDICAL DECISION MAKING: Patient is a 23 y.o. male who presented with chief complaint of Worsening abscess of the left upper thigh despite taking Bactrim.  Denies systemic symptoms.  On examination, he has a large abscess with significant induration, fluctuance, no active drainage.  Differentials considered, abscess, cellulitis, lymphogranuloma venereum, lymphadenitis, sepsis, etc. given the size of the abscess, I have ordered a CT to rule out any involvement of the muscle.  CT does show cutaneous infection without any concrete abscess.  However, given the appearance and worsening with recent antibiotic, I did contact general surgeon who has agreed to take the patient to the OR for incision and drainage.  They will admit the patient overnight for continued antibiotics.  Patient was given cefepime.    CLINICAL IMPRESSION:  1. Abscess left upper thigh        Mitzi Rice PA  04/24/24 2867

## 2024-04-24 NOTE — PROGRESS NOTES
"Pharmacokinetic Initial Assessment: IV Vancomycin    Assessment/Plan:    Initiate intravenous vancomycin with loading dose of 1500 mg once followed by a maintenance dose of vancomycin 1000 mg IV every 12 hours  Desired empiric serum trough concentration is 10 to 15 mcg/mL  Draw vancomycin trough level 60 min prior to fourth dose on 4/25/24 at approximately 0430.  Pharmacy will continue to follow and monitor vancomycin.      Please contact pharmacy at extension [phone] with any questions regarding this assessment.     Thank you for the consult,   Castillo Forrest, PharmD       Patient brief summary:  Jadiel Reddy is a 23 y.o. male initiated on antimicrobial therapy with IV Vancomycin for treatment of suspected skin & soft tissue infection    Drug Allergies:   Review of patient's allergies indicates:   Allergen Reactions    No known drug allergies        Actual Body Weight:   60.8 kg    Renal Function:   Estimated Creatinine Clearance: 97.1 mL/min (based on SCr of 1 mg/dL).,     Dialysis Method (if applicable):  N/A    CBC (last 72 hours):  Recent Labs   Lab Result Units 04/24/24  1157   WBC K/uL 9.72   Hemoglobin g/dL 13.2*   Hematocrit % 39.5*   Platelets K/uL 337   Gran % % 69.4   Lymph % % 14.7*   Mono % % 10.7   Eosinophil % % 4.2   Basophil % % 0.4   Differential Method  Automated       Metabolic Panel (last 72 hours):  Recent Labs   Lab Result Units 04/24/24  1157   Sodium mmol/L 138   Potassium mmol/L 4.1   Chloride mmol/L 102   CO2 mmol/L 24   Glucose mg/dL 85   BUN mg/dL 19   Creatinine mg/dL 1.0   Albumin g/dL 3.7   Total Bilirubin mg/dL 0.4   Alkaline Phosphatase U/L 61   AST U/L 16   ALT U/L 18       Drug levels (last 3 results):  No results for input(s): "VANCOMYCINRA", "VANCORANDOM", "VANCOMYCINPE", "VANCOPEAK", "VANCOMYCINTR", "VANCOTROUGH" in the last 72 hours.    Microbiologic Results:  Microbiology Results (last 7 days)       Procedure Component Value Units Date/Time    Aerobic culture (Specify Source) " **CANNOT BE ORDERED AS STAT** [[id number]] Collected: 04/24/24 1159    Order Status: Sent Specimen: Abscess from Groin Updated: 04/24/24 1641    Aerobic culture [[id number]] Collected: 04/24/24 1527    Order Status: Sent Specimen: Abscess from Groin Updated: 04/24/24 1550    Culture, Anaerobe [[id number]] Collected: 04/24/24 1527    Order Status: Sent Specimen: Abscess from Groin Updated: 04/24/24 1550    Blood culture [[id number]] Collected: 04/24/24 1357    Order Status: Sent Specimen: Blood from Peripheral, Antecubital, Left     Blood culture [[id number]] Collected: 04/24/24 1336    Order Status: Sent Specimen: Blood from Peripheral, Antecubital, Right

## 2024-04-24 NOTE — SUBJECTIVE & OBJECTIVE
Past Medical History:   Diagnosis Date    ADHD (attention deficit hyperactivity disorder)     Dr Gar, as of Oct 2012 not taking any meds    Complication of anesthesia     has dental braces in place 2012, cannot remove    Gender dysphoria     followed by Endocrine, Dr. Salgado, in transition    Headache(784.0)     PERFORATION OF TYMPANIC MEMBRANE 2012    right, with otitis 9/28/12       Past Surgical History:   Procedure Laterality Date    ADENOIDECTOMY      TONSILLECTOMY  2002    TYMPANOSTOMY TUBE PLACEMENT  1999,2001    has had bilateral patches to eardrum       Review of patient's allergies indicates:   Allergen Reactions    No known drug allergies        Current Facility-Administered Medications   Medication Dose Route Frequency Provider Last Rate Last Admin    ceFEPIme (MAXIPIME) 2 g in dextrose 5 % in water (D5W) 100 mL IVPB (MB+)  2 g Intravenous Q12H Freddy Mckeon MD   2 g at 04/24/24 1442    dextrose 10% bolus 125 mL 125 mL  12.5 g Intravenous PRN Freddy Mckeon MD        dextrose 10% bolus 250 mL 250 mL  25 g Intravenous PRN Freddy Mckeon MD        glucagon (human recombinant) injection 1 mg  1 mg Intramuscular PRN Freddy Mckeon MD        glucose chewable tablet 16 g  16 g Oral PRN Freddy Mckeon MD        glucose chewable tablet 24 g  24 g Oral PRN Freddy Mckeon MD        lactated ringers infusion   Intravenous Continuous Bruce Blanco MD   New Bag at 04/24/24 1442    lactated ringers infusion  125 mL/hr Intravenous Continuous Bruce Blanco  mL/hr at 04/24/24 1635 125 mL/hr at 04/24/24 1635    morphine injection 2 mg  2 mg Intravenous Q4H PRN Freddy Mckeon MD   2 mg at 04/24/24 1513    naloxone 0.4 mg/mL injection 0.02 mg  0.02 mg Intravenous PRFreddy Fajardo MD        ondansetron injection 4 mg  4 mg Intravenous Q8H PRN Freddy Mckeon MD        oxyCODONE immediate release tablet 5 mg  5 mg Oral Q6H PRN Freddy Mckeon MD         prochlorperazine injection Soln 5 mg  5 mg Intravenous Q6H PRN Freddy Mckeon MD        sodium chloride 0.9% flush 10 mL  10 mL Intravenous Q12H PRN Freddy Mckeon MD        trazodone split tablet 25 mg  25 mg Oral Nightly PRN Freddy Mckeon MD        [START ON 4/25/2024] vancomycin (VANCOCIN) 1,000 mg in dextrose 5 % (D5W) 250 mL IVPB (Vial-Mate)  1,000 mg Intravenous Q12H Freddy Mckeon MD        vancomycin - pharmacy to dose   Intravenous pharmacy to manage frequency Freddy Mckeon MD        vancomycin 1,500 mg in dextrose 5 % (D5W) 250 mL IVPB (Vial-Mate)  1,500 mg Intravenous Once Freddy Mckeon .7 mL/hr at 04/24/24 1700 1,500 mg at 04/24/24 1700     Current Outpatient Medications   Medication Sig Dispense Refill    ibuprofen (ADVIL,MOTRIN) 200 MG tablet Take 400 mg by mouth every 6 (six) hours as needed for Pain.      estradiol (ESTRACE) 2 MG tablet Take 3 mg twice a day 90 tablet 6    RETIN-A 0.025 % cream   2    spironolactone (ALDACTONE) 100 MG tablet TAKE 1 TABLET(100 MG) BY MOUTH TWICE DAILY 60 tablet 1     Family History       Problem Relation (Age of Onset)    Alcohol abuse Father, Maternal Grandfather, Other    Allergic rhinitis Brother    COPD Maternal Grandfather    Heart disease Maternal Grandmother    Hypertension Maternal Grandmother    Intellectual disability Maternal Grandfather    Migraines Mother, Maternal Aunt, Maternal Uncle, Maternal Grandmother, Maternal Grandfather, Other    Other Brother          Tobacco Use    Smoking status: Former     Types: Cigarettes    Smokeless tobacco: Never   Substance and Sexual Activity    Alcohol use: Yes     Alcohol/week: 3.0 standard drinks of alcohol     Types: 3 Shots of liquor per week     Comment: 3 shots per day    Drug use: Yes     Types: Marijuana    Sexual activity: Not on file     Review of Systems   All other systems reviewed and are negative.    Objective:     Vital Signs (Most Recent):  Temp:  98.4 °F (36.9 °C) (04/24/24 1416)  Pulse: 77 (04/24/24 1603)  Resp: 11 (04/24/24 1603)  BP: 129/87 (04/24/24 1603)  SpO2: 99 % (04/24/24 1603) Vital Signs (24h Range):  Temp:  [97.3 °F (36.3 °C)-98.4 °F (36.9 °C)] 98.4 °F (36.9 °C)  Pulse:  [76-95] 77  Resp:  [11-18] 11  SpO2:  [94 %-100 %] 99 %  BP: (106-134)/(60-87) 129/87     Weight: 60.8 kg (134 lb)  Body mass index is 20.37 kg/m².     Physical Exam        Vitals reviewed  General: NAD, Well developed, Well Nourished  Head: NC/AT  Eyes: EOMI, MAXINE  Cardiovascular: Pulses intact distally, Regular Rate and rhythm  Pulmonary: Normal Respiratory Rate, No respiratory distress  Gi: Soft, Non-tender  Extremities: Warm, left anterior thigh with swelling, redness, induration. Purulent drainage easily expressed  Skin: Warm, dry  Neuro: Alert, Oriented x3, No focal Deficit  Psych: Appropriate mood and affect       Significant Labs: All pertinent labs within the past 24 hours have been reviewed.    Significant Imaging: I have reviewed all pertinent imaging results/findings within the past 24 hours.

## 2024-04-24 NOTE — OP NOTE
Patient: Jadiel Reddy     Date of Procedure: 4/24/2024    Procedure:  Incision and drainage of complex left thigh abscess    Surgeon: Carter Porter MD    Assistant: None    Pre-op Diagnosis: Abscess of left thigh [L02.416]     Post-op Diagnosis: Abscess of left thigh [L02.416], deep    Procedure in Detail:  After informed consent was obtained, consent form signed, and questions answered, the surgical site was identified and marked appropriately.  The patient was then taken to the operating room where general anesthesia was induced. Prophylactic IV antibiotics were administered.  The patient was positioned and the surgical field was prepped and draped in the usual sterile fashion. A thorough time-out procedure was performed with the surgical team.    Using a 15 blade and cautery 4 cm incision was made over the abscess pocket taken down the skin subcutaneous tissue abscess pocket was entered.  Copious amounts of purulence was expressed.  Cultures were obtained and sent for evaluation.  Loculations were broken up with suction and pocket was completely reduced.  Pressure was held for several minutes hemostasis was noted the wound was packed with sterile gauze.  Edges were left open.  Sterile dressings were applied patient was brought to the recovery room extubated in satisfactory condition.    Dressings were applied and the patient was awakened and transferred to the recovery room in stable condition. There were no immediate complications.    Specimen:  Culture sent for evaluation    EBL:  20 cc    Complications: None    This note was created using Fly6 direct voice recognition software. Note may have occasional typographical errors that may not have been identified and edited despite initial review prior to signing.

## 2024-04-24 NOTE — H&P
"  [facility]  History & Physical    Patient Name: Jadiel Reddy  MRN: [id number]  Patient Class: OP- Observation  Admission Date: 4/24/2024  Attending Physician: Freddy Mckeon MD   Primary Care Provider: Yoly, Primary Doctor         Patient information was obtained from patient, past medical records, and ER records.     Subjective:     Principal Problem:Abscess    Chief Complaint:   Chief Complaint   Patient presents with    Wound Check     Pt was seen recently and dx with swollen lymph. Treated with ax and states it has gotten worse and now has an open wound. Denies denies n/v/f. Draining "yellowish/white."        HPI: 22 yo w/ no significant PMH presenting with left thigh pain. Having significant pain, redness, and swelling for past 3 weeks. Endorsing subjective fever and chills during this time. No NVD. Hx of staph infections as child. None recently. No trauma to extremity. Was seen in ED about a week ago and discharged on abx. No improvement until abscess opened up and started draining 3 days ago. Since then swelling has improved. Feeling generally lethargic during this time.    Past Medical History:   Diagnosis Date    ADHD (attention deficit hyperactivity disorder)     Dr Gar, as of Oct 2012 not taking any meds    Complication of anesthesia     has dental braces in place 2012, cannot remove    Gender dysphoria     followed by Endocrine, Dr. Salgado, in transition    Headache(784.0)     PERFORATION OF TYMPANIC MEMBRANE 2012    right, with otitis 9/28/12       Past Surgical History:   Procedure Laterality Date    ADENOIDECTOMY      TONSILLECTOMY  2002    TYMPANOSTOMY TUBE PLACEMENT  1999,2001    has had bilateral patches to eardrum       Review of patient's allergies indicates:   Allergen Reactions    No known drug allergies        Current Facility-Administered Medications   Medication Dose Route Frequency Provider Last Rate Last Admin    ceFEPIme (MAXIPIME) 2 g in " dextrose 5 % in water (D5W) 100 mL IVPB (MB+)  2 g Intravenous Q12H Freddy Mckeon MD   2 g at 04/24/24 1442    dextrose 10% bolus 125 mL 125 mL  12.5 g Intravenous PRN Freddy Mckeon MD        dextrose 10% bolus 250 mL 250 mL  25 g Intravenous PRN Freddy Mckeon MD        glucagon (human recombinant) injection 1 mg  1 mg Intramuscular PRN Freddy Mckeon MD        glucose chewable tablet 16 g  16 g Oral PRN Freddy Mckeon MD        glucose chewable tablet 24 g  24 g Oral PRN Freddy Mckeon MD        lactated ringers infusion   Intravenous Continuous Bruce Blanco MD   New Bag at 04/24/24 1442    lactated ringers infusion  125 mL/hr Intravenous Continuous Bruce Blanco  mL/hr at 04/24/24 1635 125 mL/hr at 04/24/24 1635    morphine injection 2 mg  2 mg Intravenous Q4H PRN Freddy Mckeon MD   2 mg at 04/24/24 1513    naloxone 0.4 mg/mL injection 0.02 mg  0.02 mg Intravenous PRN Freddy Mckeon MD        ondansetron injection 4 mg  4 mg Intravenous Q8H PRN Freddy Mckeon MD        oxyCODONE immediate release tablet 5 mg  5 mg Oral Q6H PRN Freddy Mckeon MD        prochlorperazine injection Soln 5 mg  5 mg Intravenous Q6H PRN Freddy Mckeon MD        sodium chloride 0.9% flush 10 mL  10 mL Intravenous Q12H PRN Freddy Mckeon MD        trazodone split tablet 25 mg  25 mg Oral Nightly PRN Freddy Mckeon MD        [START ON 4/25/2024] vancomycin (VANCOCIN) 1,000 mg in dextrose 5 % (D5W) 250 mL IVPB (Vial-Mate)  1,000 mg Intravenous Q12H Freddy Mckeon MD        vancomycin - pharmacy to dose   Intravenous pharmacy to manage frequency Freddy Mckeon MD        vancomycin 1,500 mg in dextrose 5 % (D5W) 250 mL IVPB (Vial-Mate)  1,500 mg Intravenous Once Freddy Mckeon .7 mL/hr at 04/24/24 1700 1,500 mg at 04/24/24 1700     Current Outpatient Medications   Medication Sig Dispense Refill    ibuprofen  (ADVIL,MOTRIN) 200 MG tablet Take 400 mg by mouth every 6 (six) hours as needed for Pain.      estradiol (ESTRACE) 2 MG tablet Take 3 mg twice a day 90 tablet 6    RETIN-A 0.025 % cream   2    spironolactone (ALDACTONE) 100 MG tablet TAKE 1 TABLET(100 MG) BY MOUTH TWICE DAILY 60 tablet 1     Family History       Problem Relation (Age of Onset)    Alcohol abuse Father, Maternal Grandfather, Other    Allergic rhinitis Brother    COPD Maternal Grandfather    Heart disease Maternal Grandmother    Hypertension Maternal Grandmother    Intellectual disability Maternal Grandfather    Migraines Mother, Maternal Aunt, Maternal Uncle, Maternal Grandmother, Maternal Grandfather, Other    Other Brother          Tobacco Use    Smoking status: Former     Types: Cigarettes    Smokeless tobacco: Never   Substance and Sexual Activity    Alcohol use: Yes     Alcohol/week: 3.0 standard drinks of alcohol     Types: 3 Shots of liquor per week     Comment: 3 shots per day    Drug use: Yes     Types: Marijuana    Sexual activity: Not on file     Review of Systems   All other systems reviewed and are negative.    Objective:     Vital Signs (Most Recent):  Temp: 98.4 °F (36.9 °C) (04/24/24 1416)  Pulse: 77 (04/24/24 1603)  Resp: 11 (04/24/24 1603)  BP: 129/87 (04/24/24 1603)  SpO2: 99 % (04/24/24 1603) Vital Signs (24h Range):  Temp:  [97.3 °F (36.3 °C)-98.4 °F (36.9 °C)] 98.4 °F (36.9 °C)  Pulse:  [76-95] 77  Resp:  [11-18] 11  SpO2:  [94 %-100 %] 99 %  BP: (106-134)/(60-87) 129/87     Weight: 60.8 kg (134 lb)  Body mass index is 20.37 kg/m².     Physical Exam        Vitals reviewed  General: NAD, Well developed, Well Nourished  Head: NC/AT  Eyes: EOMI, MAXINE  Cardiovascular: Pulses intact distally, Regular Rate and rhythm  Pulmonary: Normal Respiratory Rate, No respiratory distress  Gi: Soft, Non-tender  Extremities: Warm, left anterior thigh with swelling, redness, induration. Purulent drainage easily expressed  Skin: Warm, dry  Neuro:  Alert, Oriented x3, No focal Deficit  Psych: Appropriate mood and affect       Significant Labs: All pertinent labs within the past 24 hours have been reviewed.    Significant Imaging: I have reviewed all pertinent imaging results/findings within the past 24 hours.  Assessment/Plan:     * Abscess  Left thigh abscess- s/p I&D with general surgery on 4/24  IV abx  IVF  F/U blood and wound cultures  PRN medications for pain and nausea        VTE Risk Mitigation (From admission, onward)           Ordered     IP VTE LOW RISK PATIENT  Once         04/24/24 1334     Place sequential compression device  Until discontinued         04/24/24 1334                       On 04/24/2024, patient should be placed in hospital observation services under my care.        Pharmacokinetic Initial Assessment: IV Vancomycin    Assessment/Plan:    Initiate intravenous vancomycin with loading dose of 1500 mg once followed by a maintenance dose of vancomycin 1000 mg IV every 12 hours  Desired empiric serum trough concentration is 10 to 15 mcg/mL  Draw vancomycin trough level 60 min prior to fourth dose on 4/25/24 at approximately 0430.  Pharmacy will continue to follow and monitor vancomycin.      Please contact pharmacy at extension [phone] with any questions regarding this assessment.     Thank you for the consult,   Castillo Forrest, PharmD       Patient brief summary:  Jadiel Reddy is a 23 y.o. male initiated on antimicrobial therapy with IV Vancomycin for treatment of suspected skin & soft tissue infection    Drug Allergies:   Review of patient's allergies indicates:   Allergen Reactions    No known drug allergies        Actual Body Weight:   60.8 kg    Renal Function:   Estimated Creatinine Clearance: 97.1 mL/min (based on SCr of 1 mg/dL).,     Dialysis Method (if applicable):  N/A    CBC (last 72 hours):  Recent Labs   Lab Result Units 04/24/24  1157   WBC K/uL 9.72   Hemoglobin g/dL 13.2*   Hematocrit % 39.5*   Platelets K/uL 337   Gran %  "% 69.4   Lymph % % 14.7*   Mono % % 10.7   Eosinophil % % 4.2   Basophil % % 0.4   Differential Method  Automated       Metabolic Panel (last 72 hours):  Recent Labs   Lab Result Units 04/24/24  1157   Sodium mmol/L 138   Potassium mmol/L 4.1   Chloride mmol/L 102   CO2 mmol/L 24   Glucose mg/dL 85   BUN mg/dL 19   Creatinine mg/dL 1.0   Albumin g/dL 3.7   Total Bilirubin mg/dL 0.4   Alkaline Phosphatase U/L 61   AST U/L 16   ALT U/L 18       Drug levels (last 3 results):  No results for input(s): "VANCOMYCINRA", "VANCORANDOM", "VANCOMYCINPE", "VANCOPEAK", "VANCOMYCINTR", "VANCOTROUGH" in the last 72 hours.    Microbiologic Results:  Microbiology Results (last 7 days)       Procedure Component Value Units Date/Time    Aerobic culture (Specify Source) **CANNOT BE ORDERED AS STAT** [[id number]] Collected: 04/24/24 1159    Order Status: Sent Specimen: Abscess from Groin Updated: 04/24/24 1641    Aerobic culture [[id number]] Collected: 04/24/24 1527    Order Status: Sent Specimen: Abscess from Groin Updated: 04/24/24 1550    Culture, Anaerobe [[id number]] Collected: 04/24/24 1527    Order Status: Sent Specimen: Abscess from Groin Updated: 04/24/24 1550    Blood culture [[id number]] Collected: 04/24/24 1357    Order Status: Sent Specimen: Blood from Peripheral, Antecubital, Left     Blood culture [[id number]] Collected: 04/24/24 1336    Order Status: Sent Specimen: Blood from Peripheral, Antecubital, Right               Freddy Mckeon MD  Department of Hospital Medicine  [facility] - Emergency Dept          "

## 2024-04-24 NOTE — ANESTHESIA POSTPROCEDURE EVALUATION
Anesthesia Post Evaluation    Patient: Jadiel Reddy    Procedure(s) Performed: Procedure(s) (LRB):  INCISION AND DRAINAGE, ABSCESS (Left)    Final Anesthesia Type: general      Patient location during evaluation: PACU  Patient participation: Yes- Able to Participate  Level of consciousness: awake and alert and oriented  Post-procedure vital signs: reviewed and stable  Pain management: adequate  Airway patency: patent    PONV status at discharge: No PONV  Anesthetic complications: no      Cardiovascular status: blood pressure returned to baseline and hemodynamically stable  Respiratory status: spontaneous ventilation and room air  Hydration status: euvolemic  Follow-up not needed.            Vitals Value Taken Time   /60 04/24/24 1537   Temp 98 04/24/24 1538   Pulse 84 04/24/24 1537   Resp 17 04/24/24 1537   SpO2 95 % 04/24/24 1537   Vitals shown include unfiled device data.      No case tracking events are documented in the log.      Pain/Cruz Score: Cruz Score: 9 (4/24/2024  3:35 PM)

## 2024-04-24 NOTE — ANESTHESIA PREPROCEDURE EVALUATION
04/24/2024  Jadiel Reddy is a 23 y.o., male.   has a past surgical history that includes Tympanostomy tube placement (1999,2001); Adenoidectomy; and Tonsillectomy (2002).       Pre-op Assessment    I have reviewed the Patient Summary Reports.     I have reviewed the Nursing Notes.    I have reviewed the Medications.     Review of Systems  Anesthesia Hx:  No problems with previous Anesthesia             Denies Family Hx of Anesthesia complications.    Denies Personal Hx of Anesthesia complications.                    Social:   marijuana      Hematology/Oncology:  Hematology Normal   Oncology Normal                                   EENT/Dental:  EENT/Dental Normal           Cardiovascular:  Cardiovascular Normal                                            Pulmonary:  Pulmonary Normal                       Hepatic/GI:     GERD             Musculoskeletal:  Musculoskeletal Normal                Neurological:      Headaches                                 Endocrine:  Endocrine Normal            Psych:  Psychiatric History   ADHD  Gender dysphoria             Physical Exam  General: Well nourished, Cooperative, Alert and Oriented    Airway:  Mallampati: II   Mouth Opening: Normal  TM Distance: Normal  Tongue: Normal  Neck ROM: Normal ROM    Dental:  Intact    Chest/Lungs:  Clear to auscultation, Normal Respiratory Rate    Heart:  Rate: Normal  Rhythm: Regular Rhythm    Anesthesia Plan  Type of Anesthesia, risks & benefits discussed:    Anesthesia Type: Gen Supraglottic Airway  Intra-op Monitoring Plan: Standard ASA Monitors  Post Op Pain Control Plan: IV/PO Opioids PRN  Induction:  IV  Informed Consent: Informed consent signed with the Patient and all parties understand the risks and agree with anesthesia plan.  All questions answered.   ASA Score: 2 Emergent  Day of Surgery Review of History & Physical: H&P  Update referred to the surgeon/provider.    Ready For Surgery From Anesthesia Perspective.   .

## 2024-04-24 NOTE — HPI
22 yo w/ no significant PMH presenting with left thigh pain. Having significant pain, redness, and swelling for past 3 weeks. Endorsing subjective fever and chills during this time. No NVD. Hx of staph infections as child. None recently. No trauma to extremity. Was seen in ED about a week ago and discharged on abx. No improvement until abscess opened up and started draining 3 days ago. Since then swelling has improved. Feeling generally lethargic during this time.

## 2024-04-25 PROBLEM — L02.416 ABSCESS OF LEFT THIGH: Status: ACTIVE | Noted: 2024-04-25

## 2024-04-25 PROBLEM — L02.416 ABSCESS OF LEFT THIGH: Status: RESOLVED | Noted: 2024-04-25 | Resolved: 2024-04-25

## 2024-04-25 LAB
ALBUMIN SERPL BCP-MCNC: 3.2 G/DL (ref 3.5–5.2)
ALP SERPL-CCNC: 57 U/L (ref 55–135)
ALT SERPL W/O P-5'-P-CCNC: 15 U/L (ref 10–44)
ANION GAP SERPL CALC-SCNC: 10 MMOL/L (ref 8–16)
AST SERPL-CCNC: 13 U/L (ref 10–40)
BASOPHILS # BLD AUTO: 0.02 K/UL (ref 0–0.2)
BASOPHILS NFR BLD: 0.2 % (ref 0–1.9)
BILIRUB SERPL-MCNC: 0.3 MG/DL (ref 0.1–1)
BUN SERPL-MCNC: 11 MG/DL (ref 6–20)
CALCIUM SERPL-MCNC: 8.7 MG/DL (ref 8.7–10.5)
CHLORIDE SERPL-SCNC: 105 MMOL/L (ref 95–110)
CO2 SERPL-SCNC: 21 MMOL/L (ref 23–29)
CREAT SERPL-MCNC: 0.9 MG/DL (ref 0.5–1.4)
DIFFERENTIAL METHOD BLD: ABNORMAL
EOSINOPHIL # BLD AUTO: 0 K/UL (ref 0–0.5)
EOSINOPHIL NFR BLD: 0 % (ref 0–8)
ERYTHROCYTE [DISTWIDTH] IN BLOOD BY AUTOMATED COUNT: 11.7 % (ref 11.5–14.5)
EST. GFR  (NO RACE VARIABLE): >60 ML/MIN/1.73 M^2
GLUCOSE SERPL-MCNC: 181 MG/DL (ref 70–110)
HCT VFR BLD AUTO: 36.1 % (ref 40–54)
HGB BLD-MCNC: 12 G/DL (ref 14–18)
HIV 1+2 AB+HIV1 P24 AG SERPL QL IA: NORMAL
IMM GRANULOCYTES # BLD AUTO: 0.06 K/UL (ref 0–0.04)
IMM GRANULOCYTES NFR BLD AUTO: 0.7 % (ref 0–0.5)
LYMPHOCYTES # BLD AUTO: 0.7 K/UL (ref 1–4.8)
LYMPHOCYTES NFR BLD: 8.4 % (ref 18–48)
MAGNESIUM SERPL-MCNC: 1.9 MG/DL (ref 1.6–2.6)
MCH RBC QN AUTO: 27.6 PG (ref 27–31)
MCHC RBC AUTO-ENTMCNC: 33.2 G/DL (ref 32–36)
MCV RBC AUTO: 83 FL (ref 82–98)
MONOCYTES # BLD AUTO: 0.6 K/UL (ref 0.3–1)
MONOCYTES NFR BLD: 6.7 % (ref 4–15)
MRSA ID BY PCR: NEGATIVE
NEUTROPHILS # BLD AUTO: 7.2 K/UL (ref 1.8–7.7)
NEUTROPHILS NFR BLD: 84 % (ref 38–73)
NRBC BLD-RTO: 0 /100 WBC
PLATELET # BLD AUTO: 365 K/UL (ref 150–450)
PMV BLD AUTO: 9.6 FL (ref 9.2–12.9)
POTASSIUM SERPL-SCNC: 4.3 MMOL/L (ref 3.5–5.1)
PROT SERPL-MCNC: 7.1 G/DL (ref 6–8.4)
RBC # BLD AUTO: 4.35 M/UL (ref 4.6–6.2)
SODIUM SERPL-SCNC: 136 MMOL/L (ref 136–145)
STAPH AUREUS ID BY PCR: NEGATIVE
WBC # BLD AUTO: 8.53 K/UL (ref 3.9–12.7)

## 2024-04-25 PROCEDURE — 25000003 PHARM REV CODE 250: Performed by: STUDENT IN AN ORGANIZED HEALTH CARE EDUCATION/TRAINING PROGRAM

## 2024-04-25 PROCEDURE — 25000003 PHARM REV CODE 250: Performed by: SPECIALIST

## 2024-04-25 PROCEDURE — 99900031 HC PATIENT EDUCATION (STAT)

## 2024-04-25 PROCEDURE — 85025 COMPLETE CBC W/AUTO DIFF WBC: CPT | Performed by: STUDENT IN AN ORGANIZED HEALTH CARE EDUCATION/TRAINING PROGRAM

## 2024-04-25 PROCEDURE — 99239 HOSP IP/OBS DSCHRG MGMT >30: CPT | Mod: ,,, | Performed by: INTERNAL MEDICINE

## 2024-04-25 PROCEDURE — 96361 HYDRATE IV INFUSION ADD-ON: CPT

## 2024-04-25 PROCEDURE — 96366 THER/PROPH/DIAG IV INF ADDON: CPT

## 2024-04-25 PROCEDURE — 96376 TX/PRO/DX INJ SAME DRUG ADON: CPT

## 2024-04-25 PROCEDURE — 94761 N-INVAS EAR/PLS OXIMETRY MLT: CPT

## 2024-04-25 PROCEDURE — 63600175 PHARM REV CODE 636 W HCPCS: Performed by: STUDENT IN AN ORGANIZED HEALTH CARE EDUCATION/TRAINING PROGRAM

## 2024-04-25 PROCEDURE — 63600175 PHARM REV CODE 636 W HCPCS: Performed by: SPECIALIST

## 2024-04-25 PROCEDURE — 63600175 PHARM REV CODE 636 W HCPCS: Mod: JZ,JG | Performed by: STUDENT IN AN ORGANIZED HEALTH CARE EDUCATION/TRAINING PROGRAM

## 2024-04-25 PROCEDURE — G0378 HOSPITAL OBSERVATION PER HR: HCPCS

## 2024-04-25 PROCEDURE — 80053 COMPREHEN METABOLIC PANEL: CPT | Performed by: STUDENT IN AN ORGANIZED HEALTH CARE EDUCATION/TRAINING PROGRAM

## 2024-04-25 PROCEDURE — 83735 ASSAY OF MAGNESIUM: CPT | Performed by: STUDENT IN AN ORGANIZED HEALTH CARE EDUCATION/TRAINING PROGRAM

## 2024-04-25 PROCEDURE — 94799 UNLISTED PULMONARY SVC/PX: CPT

## 2024-04-25 RX ORDER — CLINDAMYCIN HYDROCHLORIDE 150 MG/1
300 CAPSULE ORAL 4 TIMES DAILY
Qty: 80 CAPSULE | Refills: 0 | Status: SHIPPED | OUTPATIENT
Start: 2024-04-25 | End: 2024-05-05

## 2024-04-25 RX ADMIN — HYDROCODONE BITARTRATE AND ACETAMINOPHEN 1 TABLET: 5; 325 TABLET ORAL at 02:04

## 2024-04-25 RX ADMIN — CEFEPIME 2 G: 2 INJECTION, POWDER, FOR SOLUTION INTRAVENOUS at 11:04

## 2024-04-25 RX ADMIN — ONDANSETRON 4 MG: 2 INJECTION INTRAMUSCULAR; INTRAVENOUS at 03:04

## 2024-04-25 RX ADMIN — MORPHINE SULFATE 2 MG: 2 INJECTION, SOLUTION INTRAMUSCULAR; INTRAVENOUS at 03:04

## 2024-04-25 RX ADMIN — SODIUM CHLORIDE: 9 INJECTION, SOLUTION INTRAVENOUS at 06:04

## 2024-04-25 RX ADMIN — MORPHINE SULFATE 2 MG: 2 INJECTION, SOLUTION INTRAMUSCULAR; INTRAVENOUS at 09:04

## 2024-04-25 RX ADMIN — CEFEPIME 2 G: 2 INJECTION, POWDER, FOR SOLUTION INTRAVENOUS at 02:04

## 2024-04-25 RX ADMIN — MORPHINE SULFATE 2 MG: 2 INJECTION, SOLUTION INTRAMUSCULAR; INTRAVENOUS at 06:04

## 2024-04-25 RX ADMIN — VANCOMYCIN HYDROCHLORIDE 1000 MG: 1 INJECTION, POWDER, LYOPHILIZED, FOR SOLUTION INTRAVENOUS at 02:04

## 2024-04-25 RX ADMIN — VANCOMYCIN HYDROCHLORIDE 1000 MG: 1 INJECTION, POWDER, LYOPHILIZED, FOR SOLUTION INTRAVENOUS at 05:04

## 2024-04-25 NOTE — HOSPITAL COURSE
Patient is a 23-year-old male that presented with complaints of pain and swelling of the left anterior thigh/groin area.  Patient states that he has had pain in his has worsened over the past week.  There is redness and fluctuance in the area.   , general surgeon was consulted, and took this patient to the operating room for incision and drainage of this area.  Patient was incised in yielded at least 25 mL of pus.  Area was cleaned and irrigated thoroughly and patient was placed on IV antibiotics while admitted.  Patient will be discharged today with wound care instructions prior to discharge and clindamycin 300 mg p.o. q.6 hours for 10 days.  Cultures are pending and we will follow cultures based on results we will adjust antibiotics as necessary.  Patient states he is feeling much better in was stable with vital signs stable patient being afebrile in having no other issues.  We will dressed daily with wet-to-dry dressings after cleaning with Betadine.  Patient will follow up with his primary care physician if needed for any other intercurrent problems.  Patient will follow with Dr. Nunez in his office as scheduled.

## 2024-04-25 NOTE — ED NOTES
Dr. Atwood in the ER rounding on his pt, md made aware of discharge home order noted for this pt, made aware, pt has 2 antibiotics pending infusion ivpb,     Per md, pt is to receive both antibiotics ivpb prior to being discharge home

## 2024-04-25 NOTE — PLAN OF CARE
[facility] - Emergency Dept  Discharge Final Note    Primary Care Provider: Patito Sullivan NP    Expected Discharge Date: 4/25/2024    Verbal follow up appointment with Patito Sullivan NP & Dr Porter provided to patient. Also provided her with phone number for [facility] financial assistance. Demonstrated understanding by verbal feedback. Denies any other needs at this time. OK for DC from CM standpoint.    Final Discharge Note (most recent)       Final Note - 04/25/24 1418          Final Note    Assessment Type Final Discharge Note     Anticipated Discharge Disposition Home or Self Care     What phone number can be called within the next 1-3 days to see how you are doing after discharge? [phone]     Hospital Resources/Appts/Education Provided Community resources provided;Appointments scheduled and added to AVS        Post-Acute Status    Discharge Delays None known at this time                     Important Message from Medicare             Contact Carter Mosquera MD   Specialty: General Surgery    [address] [place] [id number]   Phone: [phone]       Next Steps: Go on 5/3/2024    Instructions: appointment Friday May 3rd at 11:45am for surgery follow up    Patito Mckenzie NP    [address] [place] [address]  [phone]       Next Steps: Go on 5/1/2024    Instructions: appointment Wednesday May 1st at 10:45am for hospital follow up

## 2024-04-25 NOTE — PROGRESS NOTES
Patient is currently observation admission, ED HOLD, bed 10. Up for discharge by Hospital Medicine, Dr Roach. Called Dr Roach and advised of blood culture report: Gram stain aer bottle: Gram positive cocci in clusters resembling Staph. States he will cancel discharge and he will keep patient another night, reevaluate in the morning. RN advised.

## 2024-04-25 NOTE — ED NOTES
Case discussed with provider in ER, ENOC maryan is not assigned as a provider for this pt, I inform NP maryan that I received a call from lab reporting positive cluster coccy and that I have message dr Briseno, that I am holding discharge until I heard from him, advise received for the best interest of this pt,     I will wait for response from dr. Briseno before I discharge this pt      Pt is made aware of positive blood cultures and that there is a possibility of remaining in hospital, a possibility that she might have to receive further antibiotics, that I am awaiting for response from her doctor, pt verbalized understanding       NP Maryan reports she will call dr. Briseno and notify him of the positive blood cultures      NP maryan informs me that she spoke to dr. Briseno and that pt will remain in hospital, discharge order will be d/c    Pt is made aware, she will not be discharge and that I am awaiting to hear what the next plan of care will be, pt and pt's significant other verbalized understanding

## 2024-04-25 NOTE — DISCHARGE SUMMARY
[facility]  [facility] Medicine  Discharge Summary      Patient Name: Jadiel Reddy  MRN: [id number]  SHERRELL: [id number]  Patient Class: OP- Observation  Admission Date: 4/24/2024  Hospital Length of Stay: 0 days  Discharge Date and Time:  04/25/2024 11:21 AM  Attending Physician: Hilario Briseno MD   Discharging Provider: Hilario Briseno MD  Primary Care Provider: Yoly, Primary Doctor    Primary Care Team: Networked reference to record PCT     HPI:   24 yo w/ no significant PMH presenting with left thigh pain. Having significant pain, redness, and swelling for past 3 weeks. Endorsing subjective fever and chills during this time. No NVD. Hx of staph infections as child. None recently. No trauma to extremity. Was seen in ED about a week ago and discharged on abx. No improvement until abscess opened up and started draining 3 days ago. Since then swelling has improved. Feeling generally lethargic during this time.    Procedure(s) (LRB):  INCISION AND DRAINAGE, ABSCESS (Left)      Hospital Course:   Patient is a 23-year-old male that presented with complaints of pain and swelling of the left anterior thigh/groin area.  Patient states that he has had pain in his has worsened over the past week.  There is redness and fluctuance in the area.   , general surgeon was consulted, and took this patient to the operating room for incision and drainage of this area.  Patient was incised in yielded at least 25 mL of pus.  Area was cleaned and irrigated thoroughly and patient was placed on IV antibiotics while admitted.  Patient will be discharged today with wound care instructions prior to discharge and clindamycin 300 mg p.o. q.6 hours for 10 days.  Cultures are pending and we will follow cultures based on results we will adjust antibiotics as necessary.  Patient states he is feeling much better in was stable with vital signs stable patient being afebrile in having no other issues.  We will dressed daily with  "wet-to-dry dressings after cleaning with Betadine.  Patient will follow up with his primary care physician if needed for any other intercurrent problems.  Patient will follow with Dr. Nunez in his office as scheduled.     Goals of Care Treatment Preferences:  Code Status: Full Code      Consults:   Consults (From admission, onward)          Status Ordering Provider     Pharmacy to dose Vancomycin consult  Once        Provider:  (Not yet assigned)   Placed in "And" Linked Group    Acknowledged ANATOLIY WOO            No new Assessment & Plan notes have been filed under this hospital service since the last note was generated.  Service: Hospital Medicine    Final Active Diagnoses:    Diagnosis Date Noted POA    Abscess [L02.91] 04/24/2024 Yes      Problems Resolved During this Admission:    Diagnosis Date Noted Date Resolved POA    PRINCIPAL PROBLEM:  Abscess of left thigh [L02.416] 04/25/2024 04/25/2024 Yes       Discharged Condition: stable    Disposition: Home or Self Care    Follow Up:   Follow-up Information       Anatoliy Woo MD. Go on 5/3/2024.    Specialty: General Surgery  Why: appointment Friday May 3rd at 11:45am for hospital follow up  Contact information:  [address] [place] [address]  [address] [facility] [address]  [phone]                           Patient Instructions:      Diet Adult Regular     Notify your health care provider if you experience any of the following:  temperature >100.4     Notify your health care provider if you experience any of the following:  persistent nausea and vomiting or diarrhea     Notify your health care provider if you experience any of the following:  severe uncontrolled pain     Notify your health care provider if you experience any of the following:  redness, tenderness, or signs of infection (pain, swelling, redness, odor or green/yellow discharge around incision site)     Notify your health care provider if you experience any of the following:  difficulty " breathing or increased cough     Notify your health care provider if you experience any of the following:  severe persistent headache     Remove dressing in 24 hours     Change dressing (specify)   Order Comments: Dressing change: 1 times per day using betadine to clean wound and pack with Wet to dry sterile gauze and cover with ABD pad.  Keep covered between dressing changes.  Antibiotics as prescribed..     Activity as tolerated       Significant Diagnostic Studies: Labs: All labs within the past 24 hours have been reviewed    Pending Diagnostic Studies:       None           Medications:  Reconciled Home Medications:      Medication List        START taking these medications      clindamycin 150 MG capsule  Commonly known as: CLEOCIN  Take 2 capsules (300 mg total) by mouth 4 (four) times daily. for 10 days            CONTINUE taking these medications      estradioL 2 MG tablet  Commonly known as: ESTRACE  Take 3 mg twice a day     ibuprofen 200 MG tablet  Commonly known as: ADVIL,MOTRIN  Take 400 mg by mouth every 6 (six) hours as needed for Pain.     RETIN-A 0.025 % cream  Generic drug: tretinoin     spironolactone 100 MG tablet  Commonly known as: ALDACTONE  TAKE 1 TABLET(100 MG) BY MOUTH TWICE DAILY            STOP taking these medications      sulfamethoxazole-trimethoprim 800-160mg 800-160 mg Tab  Commonly known as: BACTRIM DS              Indwelling Lines/Drains at time of discharge:   Lines/Drains/Airways       None                   Time spent on the discharge of patient: 35 minutes         Hilario Briseno MD  Department of Hospital Medicine  [facility] Emergency Dept

## 2024-04-25 NOTE — ED NOTES
Leoncio Jones RN House Supervisor, notified of discharge being cancelled and patient needing inpatient bed.

## 2024-04-25 NOTE — PLAN OF CARE
[facility] Emergency Dept  Initial Discharge Assessment       Primary Care Provider: Patito Sullivan NP    Admission Diagnosis: Abscess [L02.91]  Chest pain [R07.9]    Admission Date: 4/24/2024  Expected Discharge Date: 4/25/2024    Transition of Care Barriers: None    Patient alert & oriented lives at home with her marybeth Henry who is at bedside. She is independent at home. She denies using any medical equipment. She used to drink a lot of tequila but has gradually been cutting back & now drinks 1-2 shots/night. She does smoke THC. She uses Patito Sullivan NP for PCP. She uses [facility] for pharmacy.  She has [employer] & we are out of network. Will provide her with financial assistance phone number for [facility]. Denies any needs at this time.     Payor: [employer] / Plan: [employer] / Product Type: PPO /     Extended Emergency Contact Information  Primary Emergency Contact: Tim Juarez   [place]  Mobile Phone: [phone]  Relation: Friend  Secondary Emergency Contact: Jonna Reddy   [facility]  Home Phone: [phone]  Relation: Mother    Discharge Plan A: Home with family  Discharge Plan B: Home with family      [id number] - [place], [place] - [address] [place] [address] AT [address]  [address] [facility] [address]  [facility] [address]  Phone: [phone] Fax: [phone]      Initial Assessment (most recent)       Adult Discharge Assessment - 04/25/24 1215          Discharge Assessment    Assessment Type Discharge Planning Assessment     Confirmed/corrected address, phone number and insurance Yes     Confirmed Demographics Correct on Facesheet     Source of Information patient     When was your last doctors appointment? --   2 weeks ago    Does patient/caregiver understand observation status Yes     Communicated MARK with patient/caregiver Yes     Reason For Admission abscess     People in Home significant other     Do you expect  to return to your current living situation? Yes     Do you have help at home or someone to help you manage your care at home? Yes     Who are your caregiver(s) and their phone number(s)? Carl rueda [phone]     Prior to hospitilization cognitive status: Alert/Oriented     Current cognitive status: Alert/Oriented     Walking or Climbing Stairs Difficulty no     Dressing/Bathing Difficulty no     Home Accessibility stairs to enter home     Number of Stairs, Main Entrance other (see comments)   20    Home Layout Able to live on 1st floor     Equipment Currently Used at Home none     Readmission within 30 days? No     Patient currently being followed by outpatient case management? No     Do you currently have service(s) that help you manage your care at home? No     Do you take prescription medications? Yes     Do you have prescription coverage? Yes     Coverage [employer]     Do you have any problems affording any of your prescribed medications? No     Is the patient taking medications as prescribed? yes     Who is going to help you get home at discharge? Carl Nova     How do you get to doctors appointments? car, drives self     Are you on dialysis? No     Do you take coumadin? No     Discharge Plan A Home with family     Discharge Plan B Home with family     DME Needed Upon Discharge  none     Discharge Plan discussed with: Patient;Spouse/sig other     Name(s) and Number(s) Carl rueda [phone]     Transition of Care Barriers None        Physical Activity    On average, how many days per week do you engage in moderate to strenuous exercise (like a brisk walk)? 7 days     On average, how many minutes do you engage in exercise at this level? 40 min        Financial Resource Strain    How hard is it for you to pay for the very basics like food, housing, medical care, and heating? Very hard        Housing Stability    In the last 12 months, was there a time when you were not able to pay the  mortgage or rent on time? Yes     In the past 12 months, how many times have you moved where you were living? 2     At any time in the past 12 months, were you homeless or living in a shelter (including now)? No        Transportation Needs    In the past 12 months, has lack of transportation kept you from medical appointments or from getting medications? No     In the past 12 months, has lack of transportation kept you from meetings, work, or from getting things needed for daily living? No        Food Insecurity    Within the past 12 months, you worried that your food would run out before you got the money to buy more. Often true     Within the past 12 months, the food you bought just didn't last and you didn't have money to get more. Often true        Stress    Do you feel stress - tense, restless, nervous, or anxious, or unable to sleep at night because your mind is troubled all the time - these days? Very much        Social Connections    In a typical week, how many times do you talk on the phone with family, friends, or neighbors? More than three times a week     How often do you get together with friends or relatives? Never     How often do you attend Zoroastrianism or Buddhism services? Never     Do you belong to any clubs or organizations such as Zoroastrianism groups, unions, fraternal or athletic groups, or school groups? No     Are you , , , , never , or living with a partner? Living with partner        Alcohol Use    Q1: How often do you have a drink containing alcohol? 4 or more times a week     Q2: How many drinks containing alcohol do you have on a typical day when you are drinking? 1 or 2     Q3: How often do you have six or more drinks on one occasion? Less than monthly        OTHER    Name(s) of People in Home Carl Nova marybeth [phone]

## 2024-04-25 NOTE — ED NOTES
Pt ambulated to bathroom w/steady gait. Gave pt chlorhexidine gluconate cloth wipes to wipe self off

## 2024-04-26 LAB
ALBUMIN SERPL BCP-MCNC: 3.1 G/DL (ref 3.5–5.2)
ALP SERPL-CCNC: 48 U/L (ref 55–135)
ALT SERPL W/O P-5'-P-CCNC: 13 U/L (ref 10–44)
ANION GAP SERPL CALC-SCNC: 11 MMOL/L (ref 8–16)
AST SERPL-CCNC: 13 U/L (ref 10–40)
BASOPHILS # BLD AUTO: 0.02 K/UL (ref 0–0.2)
BASOPHILS NFR BLD: 0.3 % (ref 0–1.9)
BILIRUB SERPL-MCNC: 0.2 MG/DL (ref 0.1–1)
BUN SERPL-MCNC: 7 MG/DL (ref 6–20)
CALCIUM SERPL-MCNC: 8.5 MG/DL (ref 8.7–10.5)
CHLORIDE SERPL-SCNC: 107 MMOL/L (ref 95–110)
CO2 SERPL-SCNC: 23 MMOL/L (ref 23–29)
CREAT SERPL-MCNC: 0.8 MG/DL (ref 0.5–1.4)
DIFFERENTIAL METHOD BLD: ABNORMAL
EOSINOPHIL # BLD AUTO: 0.1 K/UL (ref 0–0.5)
EOSINOPHIL NFR BLD: 1.2 % (ref 0–8)
ERYTHROCYTE [DISTWIDTH] IN BLOOD BY AUTOMATED COUNT: 11.9 % (ref 11.5–14.5)
EST. GFR  (NO RACE VARIABLE): >60 ML/MIN/1.73 M^2
GLUCOSE SERPL-MCNC: 143 MG/DL (ref 70–110)
HCT VFR BLD AUTO: 34.5 % (ref 40–54)
HGB BLD-MCNC: 11.6 G/DL (ref 14–18)
IMM GRANULOCYTES # BLD AUTO: 0.04 K/UL (ref 0–0.04)
IMM GRANULOCYTES NFR BLD AUTO: 0.7 % (ref 0–0.5)
LYMPHOCYTES # BLD AUTO: 2.1 K/UL (ref 1–4.8)
LYMPHOCYTES NFR BLD: 35.1 % (ref 18–48)
MAGNESIUM SERPL-MCNC: 1.7 MG/DL (ref 1.6–2.6)
MCH RBC QN AUTO: 28.2 PG (ref 27–31)
MCHC RBC AUTO-ENTMCNC: 33.6 G/DL (ref 32–36)
MCV RBC AUTO: 84 FL (ref 82–98)
MONOCYTES # BLD AUTO: 0.6 K/UL (ref 0.3–1)
MONOCYTES NFR BLD: 10.6 % (ref 4–15)
NEUTROPHILS # BLD AUTO: 3.1 K/UL (ref 1.8–7.7)
NEUTROPHILS NFR BLD: 52.1 % (ref 38–73)
NRBC BLD-RTO: 0 /100 WBC
PLATELET # BLD AUTO: 317 K/UL (ref 150–450)
PMV BLD AUTO: 9.5 FL (ref 9.2–12.9)
POTASSIUM SERPL-SCNC: 3.3 MMOL/L (ref 3.5–5.1)
PROT SERPL-MCNC: 6.6 G/DL (ref 6–8.4)
RBC # BLD AUTO: 4.11 M/UL (ref 4.6–6.2)
SODIUM SERPL-SCNC: 141 MMOL/L (ref 136–145)
WBC # BLD AUTO: 5.87 K/UL (ref 3.9–12.7)

## 2024-04-26 PROCEDURE — G0378 HOSPITAL OBSERVATION PER HR: HCPCS

## 2024-04-26 PROCEDURE — 63600175 PHARM REV CODE 636 W HCPCS: Mod: JZ,JG | Performed by: STUDENT IN AN ORGANIZED HEALTH CARE EDUCATION/TRAINING PROGRAM

## 2024-04-26 PROCEDURE — 80053 COMPREHEN METABOLIC PANEL: CPT | Performed by: STUDENT IN AN ORGANIZED HEALTH CARE EDUCATION/TRAINING PROGRAM

## 2024-04-26 PROCEDURE — 94640 AIRWAY INHALATION TREATMENT: CPT

## 2024-04-26 PROCEDURE — 94799 UNLISTED PULMONARY SVC/PX: CPT

## 2024-04-26 PROCEDURE — 96376 TX/PRO/DX INJ SAME DRUG ADON: CPT

## 2024-04-26 PROCEDURE — 83735 ASSAY OF MAGNESIUM: CPT | Performed by: STUDENT IN AN ORGANIZED HEALTH CARE EDUCATION/TRAINING PROGRAM

## 2024-04-26 PROCEDURE — 94761 N-INVAS EAR/PLS OXIMETRY MLT: CPT

## 2024-04-26 PROCEDURE — 25000242 PHARM REV CODE 250 ALT 637 W/ HCPCS: Performed by: STUDENT IN AN ORGANIZED HEALTH CARE EDUCATION/TRAINING PROGRAM

## 2024-04-26 PROCEDURE — 96366 THER/PROPH/DIAG IV INF ADDON: CPT

## 2024-04-26 PROCEDURE — 63600175 PHARM REV CODE 636 W HCPCS: Performed by: SPECIALIST

## 2024-04-26 PROCEDURE — 85025 COMPLETE CBC W/AUTO DIFF WBC: CPT | Performed by: STUDENT IN AN ORGANIZED HEALTH CARE EDUCATION/TRAINING PROGRAM

## 2024-04-26 PROCEDURE — 25000003 PHARM REV CODE 250: Performed by: SPECIALIST

## 2024-04-26 PROCEDURE — 99900035 HC TECH TIME PER 15 MIN (STAT)

## 2024-04-26 PROCEDURE — 25000003 PHARM REV CODE 250: Performed by: STUDENT IN AN ORGANIZED HEALTH CARE EDUCATION/TRAINING PROGRAM

## 2024-04-26 RX ORDER — HYDROCODONE BITARTRATE AND ACETAMINOPHEN 5; 325 MG/1; MG/1
1 TABLET ORAL EVERY 6 HOURS PRN
Qty: 20 TABLET | Refills: 0 | Status: SHIPPED | OUTPATIENT
Start: 2024-04-26

## 2024-04-26 RX ORDER — SULFAMETHOXAZOLE AND TRIMETHOPRIM 800; 160 MG/1; MG/1
1 TABLET ORAL 2 TIMES DAILY
Qty: 20 TABLET | Refills: 0 | Status: SHIPPED | OUTPATIENT
Start: 2024-04-26 | End: 2024-05-06

## 2024-04-26 RX ORDER — SULFAMETHOXAZOLE AND TRIMETHOPRIM 800; 160 MG/1; MG/1
1 TABLET ORAL 2 TIMES DAILY
Qty: 14 TABLET | Refills: 0 | Status: SHIPPED | OUTPATIENT
Start: 2024-04-26 | End: 2024-05-03

## 2024-04-26 RX ORDER — IPRATROPIUM BROMIDE AND ALBUTEROL SULFATE 2.5; .5 MG/3ML; MG/3ML
3 SOLUTION RESPIRATORY (INHALATION) EVERY 6 HOURS PRN
Status: DISCONTINUED | OUTPATIENT
Start: 2024-04-26 | End: 2024-04-26 | Stop reason: HOSPADM

## 2024-04-26 RX ADMIN — HYDROCODONE BITARTRATE AND ACETAMINOPHEN 1 TABLET: 5; 325 TABLET ORAL at 04:04

## 2024-04-26 RX ADMIN — CEFEPIME 2 G: 2 INJECTION, POWDER, FOR SOLUTION INTRAVENOUS at 02:04

## 2024-04-26 RX ADMIN — IPRATROPIUM BROMIDE AND ALBUTEROL SULFATE 3 ML: .5; 2.5 SOLUTION RESPIRATORY (INHALATION) at 01:04

## 2024-04-26 RX ADMIN — HYDROCODONE BITARTRATE AND ACETAMINOPHEN 1 TABLET: 5; 325 TABLET ORAL at 12:04

## 2024-04-26 RX ADMIN — MORPHINE SULFATE 2 MG: 2 INJECTION, SOLUTION INTRAMUSCULAR; INTRAVENOUS at 01:04

## 2024-04-26 RX ADMIN — VANCOMYCIN HYDROCHLORIDE 1000 MG: 1 INJECTION, POWDER, LYOPHILIZED, FOR SOLUTION INTRAVENOUS at 05:04

## 2024-04-26 NOTE — PLAN OF CARE
[facility]  Discharge Final Note    Primary Care Provider: Patito Sullivan NP    Expected Discharge Date: 4/26/2024    Verbal follow up appointments with Patito Sullivan NP & Dr Porter provided to patient. Also provided patient with phone number for [facility] financial assistance. Voucher to use at [facility] provided to patient to use for wound supplied. Demonstrated understanding by verbal feedback. Denies any other needs at this time. Nurse will make sure family member & patient understands how to do dressing change prior to discharge. OK for DC from CM standpoint.     Final Discharge Note (most recent)       Final Note - 04/26/24 1320          Final Note    Assessment Type Final Discharge Note     Anticipated Discharge Disposition Home or Self Care     What phone number can be called within the next 1-3 days to see how you are doing after discharge? [phone]     Hospital Resources/Appts/Education Provided Appointments scheduled and added to AVS;Community resources provided        Post-Acute Status    Discharge Delays None known at this time                     Important Message from Medicare             Contact Info       Carter Porter MD   Specialty: General Surgery    [address] [place] [address]   Phone: [phone]       Next Steps: Go on 5/3/2024    Instructions: appointment Friday May 3rd at 11:45am for surgery follow up    Patito Mckenzie NP    [address] [facility], [place] [id number] [phone]       Next Steps: Go on 5/1/2024    Instructions: appointment Wednesday May 1st at 10:45am for hospital follow up

## 2024-04-26 NOTE — NURSING
Dressing change done with pt significant other and mother in room. Wound care was observed by family members as well as explained. Pt tolerated wound care well. Pt and family verbalize understanding of discharge instructions. Pt sent home with some wound care items. Pt aware of medications that need to be picked up from pt pharmacy.

## 2024-04-26 NOTE — PLAN OF CARE
Problem: Adult Inpatient Plan of Care  Goal: Plan of Care Review  Outcome: Met  Goal: Patient-Specific Goal (Individualized)  Outcome: Met  Goal: Absence of Hospital-Acquired Illness or Injury  Outcome: Met  Goal: Optimal Comfort and Wellbeing  Outcome: Met  Goal: Readiness for Transition of Care  Outcome: Met     Problem: Wound  Goal: Optimal Coping  Outcome: Met  Goal: Optimal Functional Ability  Outcome: Met  Goal: Absence of Infection Signs and Symptoms  Outcome: Met  Goal: Improved Oral Intake  Outcome: Met  Goal: Optimal Pain Control and Function  Outcome: Met  Goal: Skin Health and Integrity  Outcome: Met  Goal: Optimal Wound Healing  Outcome: Met

## 2024-04-27 LAB
BACTERIA BLD CULT: ABNORMAL
BACTERIA SPEC AEROBE CULT: NORMAL

## 2024-04-29 VITALS
OXYGEN SATURATION: 100 % | DIASTOLIC BLOOD PRESSURE: 71 MMHG | WEIGHT: 132.25 LBS | TEMPERATURE: 98 F | HEART RATE: 50 BPM | RESPIRATION RATE: 18 BRPM | HEIGHT: 68 IN | SYSTOLIC BLOOD PRESSURE: 109 MMHG | BODY MASS INDEX: 20.04 KG/M2

## 2024-04-29 LAB
BACTERIA BLD CULT: NORMAL
BACTERIA SPEC ANAEROBE CULT: NORMAL

## 2024-04-30 LAB — BACTERIA SPEC AEROBE CULT: NO GROWTH

## (undated) DEVICE — PACKING STRIP IDOFORM 1X5YD

## (undated) DEVICE — CANISTER SUCTION RIGID 3000CC

## (undated) DEVICE — SPONGE COTTON TRAY 4X4IN

## (undated) DEVICE — SPONGE GAUZE 16PLY 4X4

## (undated) DEVICE — GLOVE SENSICARE PI SURG 6

## (undated) DEVICE — ELECTRODE REM PLYHSV RETURN 9

## (undated) DEVICE — PAD ABD 8X10 STERILE

## (undated) DEVICE — GLOVE SENSICARE PI SURG 7

## (undated) DEVICE — GLOVE BIOGEL ECLIPSE SZ 7.5

## (undated) DEVICE — TRAY SKIN SCRUB WET PREMIUM

## (undated) DEVICE — Device